# Patient Record
Sex: FEMALE | Race: ASIAN | Employment: FULL TIME | URBAN - METROPOLITAN AREA
[De-identification: names, ages, dates, MRNs, and addresses within clinical notes are randomized per-mention and may not be internally consistent; named-entity substitution may affect disease eponyms.]

---

## 2017-02-15 ENCOUNTER — TELEPHONE (OUTPATIENT)
Dept: INTERNAL MEDICINE CLINIC | Facility: CLINIC | Age: 42
End: 2017-02-15

## 2017-02-15 NOTE — TELEPHONE ENCOUNTER
Received manilla envelope with paperwork from a work-sponsored metabolic screening that pt had completed. Includes results from fasting labs drawn 2/7/17. Noted elevated Cholesterol and Triglycerides and low HDL and LDL, also elevated glucose.     Requestin

## 2017-02-15 NOTE — TELEPHONE ENCOUNTER
Patient dropped off form to be filled out by  Patient was last in office for a physical December 2016. Paperwork is in a \"minalla\" envelope. Delivered this to triage in incoming fax bin.  Please call patient when form is ready to be picked up from [facility]

## 2017-02-21 NOTE — TELEPHONE ENCOUNTER
Labs reviewed. Her triglycerides are elevated, her hdl is low, her ratio is high. Her glucose is in the prediabetic range. She needs to come in and discuss these results with me before I can sign the form. Please inform pt.

## 2017-02-27 ENCOUNTER — OFFICE VISIT (OUTPATIENT)
Dept: INTERNAL MEDICINE CLINIC | Facility: CLINIC | Age: 42
End: 2017-02-27

## 2017-02-27 VITALS
RESPIRATION RATE: 12 BRPM | BODY MASS INDEX: 29.33 KG/M2 | HEART RATE: 80 BPM | DIASTOLIC BLOOD PRESSURE: 74 MMHG | WEIGHT: 149.38 LBS | SYSTOLIC BLOOD PRESSURE: 110 MMHG | TEMPERATURE: 98 F | HEIGHT: 60 IN

## 2017-02-27 DIAGNOSIS — E78.5 HYPERLIPIDEMIA, UNSPECIFIED HYPERLIPIDEMIA TYPE: Primary | ICD-10-CM

## 2017-02-27 DIAGNOSIS — R51.9 HEADACHE, UNSPECIFIED HEADACHE TYPE: ICD-10-CM

## 2017-02-27 DIAGNOSIS — R73.01 IMPAIRED FASTING GLUCOSE: ICD-10-CM

## 2017-02-27 PROCEDURE — 99214 OFFICE O/P EST MOD 30 MIN: CPT | Performed by: INTERNAL MEDICINE

## 2017-02-27 RX ORDER — MEDROXYPROGESTERONE ACETATE 10 MG/1
TABLET ORAL
Refills: 3 | COMMUNITY
Start: 2017-02-16 | End: 2017-06-05

## 2017-02-27 RX ORDER — FENOFIBRATE 54 MG/1
54 TABLET ORAL DAILY
Qty: 90 TABLET | Refills: 0 | Status: SHIPPED | OUTPATIENT
Start: 2017-02-27 | End: 2017-06-12

## 2017-02-27 NOTE — PROGRESS NOTES
[facility]    CHIEF COMPLAINT:  Patient presents with:  Test Results: discuss recent labs        HISTORY OF PRESENT ILLNESS:  Here for follow up lab results. Pt had labs done at work which showed elevated triglycerides.  She had elevated TG last PLAN:  1. Hyperlipidemia, unspecified hyperlipidemia type  Start fenofibrate 54mg daily  Low carb low fat diet. Continue exercise. cmp in a month after starting medication.   - Fenofibrate 54 MG Oral Tab; Take 1 tablet (54 mg total) by mouth daily.   Jocelyn Simpson

## 2017-06-05 ENCOUNTER — OFFICE VISIT (OUTPATIENT)
Dept: INTERNAL MEDICINE CLINIC | Facility: CLINIC | Age: 42
End: 2017-06-05

## 2017-06-05 VITALS
RESPIRATION RATE: 12 BRPM | TEMPERATURE: 98 F | HEIGHT: 60 IN | SYSTOLIC BLOOD PRESSURE: 110 MMHG | DIASTOLIC BLOOD PRESSURE: 76 MMHG | WEIGHT: 150.38 LBS | HEART RATE: 72 BPM | BODY MASS INDEX: 29.52 KG/M2

## 2017-06-05 DIAGNOSIS — J30.2 SEASONAL ALLERGIC RHINITIS, UNSPECIFIED ALLERGIC RHINITIS TRIGGER: ICD-10-CM

## 2017-06-05 DIAGNOSIS — E78.5 HYPERLIPIDEMIA, UNSPECIFIED HYPERLIPIDEMIA TYPE: Primary | ICD-10-CM

## 2017-06-05 DIAGNOSIS — J01.00 ACUTE NON-RECURRENT MAXILLARY SINUSITIS: ICD-10-CM

## 2017-06-05 DIAGNOSIS — R73.01 IMPAIRED FASTING GLUCOSE: ICD-10-CM

## 2017-06-05 PROCEDURE — 99214 OFFICE O/P EST MOD 30 MIN: CPT | Performed by: INTERNAL MEDICINE

## 2017-06-05 RX ORDER — AMOXICILLIN 875 MG/1
875 TABLET, COATED ORAL 2 TIMES DAILY
Qty: 14 TABLET | Refills: 0 | Status: SHIPPED | OUTPATIENT
Start: 2017-06-05 | End: 2017-06-12

## 2017-06-05 NOTE — PROGRESS NOTES
[facility]    CHIEF COMPLAINT:  Patient presents with:  Medication Follow-Up: did not have labs drawn        HISTORY OF PRESENT ILLNESS:  Here for follow up. Hyperlipidemia: tolerating fenofibrate. Has not done blood work yet. No muscle aches. glucose  Urged to do a1c.     4. Acute non-recurrent maxillary sinusitis  - amoxicillin 875 MG Oral Tab; Take 1 tablet (875 mg total) by mouth 2 (two) times daily. Dispense: 14 tablet;  Refill: 0          Return to clinic in 6 months for physical unless we

## 2017-06-12 DIAGNOSIS — E78.5 HYPERLIPIDEMIA, UNSPECIFIED HYPERLIPIDEMIA TYPE: Primary | ICD-10-CM

## 2017-06-12 RX ORDER — FENOFIBRATE 145 MG/1
145 TABLET, COATED ORAL DAILY
Qty: 90 TABLET | Refills: 0 | Status: SHIPPED | OUTPATIENT
Start: 2017-06-12 | End: 2018-01-04

## 2017-06-26 NOTE — ADDENDUM NOTE
Encounter addended by: Cameron Urabno LPN on: 5/66/2117  0:24 AM<BR>    Actions taken: Letter status changed

## 2018-01-04 ENCOUNTER — PATIENT MESSAGE (OUTPATIENT)
Dept: INTERNAL MEDICINE CLINIC | Facility: CLINIC | Age: 43
End: 2018-01-04

## 2018-01-04 DIAGNOSIS — E78.5 HYPERLIPIDEMIA, UNSPECIFIED HYPERLIPIDEMIA TYPE: ICD-10-CM

## 2018-01-04 RX ORDER — FENOFIBRATE 145 MG/1
145 TABLET, COATED ORAL DAILY
Qty: 90 TABLET | Refills: 0 | Status: SHIPPED | OUTPATIENT
Start: 2018-01-04 | End: 2018-01-13

## 2018-01-04 RX ORDER — FENOFIBRATE 145 MG/1
145 TABLET, COATED ORAL DAILY
Qty: 90 TABLET | Refills: 0 | Status: CANCELLED
Start: 2018-01-04

## 2018-01-04 NOTE — TELEPHONE ENCOUNTER
From: Radha Norris  Sent: 1/4/2018 9:18 AM CST  Subject: Medication Renewal Request    Jonathan Enrique would like a refill of the following medications:     Fenofibrate 145 MG Oral Tab Daniel Moon MD]    Preferred pharm

## 2018-01-04 NOTE — TELEPHONE ENCOUNTER
From: [address]  To: Lauro Bernstein MD  Sent: 1/4/2018 9:21 AM CST  Subject: Prescription Question    Hi Dr Lilia Tam, I am out of Fenofibrate. May I ask for a 30-days refill. Thank you.

## 2018-01-04 NOTE — TELEPHONE ENCOUNTER
Last OV relevant to medication: 6/5/17  Last refill date: 6/12/17     #/refills: 90/0  When pt was asked to return for OV: 6 months  Upcoming appt/reason: 1/13/18 cpx    Lab Results  Component Value Date   CHOLEST 186 06/10/2017   TRIG 217 (H) 06/10/2017

## 2018-01-13 ENCOUNTER — OFFICE VISIT (OUTPATIENT)
Dept: INTERNAL MEDICINE CLINIC | Facility: CLINIC | Age: 43
End: 2018-01-13

## 2018-01-13 VITALS
SYSTOLIC BLOOD PRESSURE: 108 MMHG | WEIGHT: 151 LBS | DIASTOLIC BLOOD PRESSURE: 62 MMHG | TEMPERATURE: 98 F | RESPIRATION RATE: 20 BRPM | HEART RATE: 68 BPM | BODY MASS INDEX: 29.64 KG/M2 | HEIGHT: 59.65 IN

## 2018-01-13 DIAGNOSIS — Z00.00 PHYSICAL EXAM, ANNUAL: ICD-10-CM

## 2018-01-13 DIAGNOSIS — E78.5 HYPERLIPIDEMIA, UNSPECIFIED HYPERLIPIDEMIA TYPE: ICD-10-CM

## 2018-01-13 DIAGNOSIS — Z12.39 SCREENING FOR MALIGNANT NEOPLASM OF BREAST: ICD-10-CM

## 2018-01-13 DIAGNOSIS — L30.9 ECZEMA, UNSPECIFIED TYPE: ICD-10-CM

## 2018-01-13 DIAGNOSIS — R21 RASH: ICD-10-CM

## 2018-01-13 DIAGNOSIS — Z23 NEED FOR VACCINATION: ICD-10-CM

## 2018-01-13 PROCEDURE — 99396 PREV VISIT EST AGE 40-64: CPT | Performed by: INTERNAL MEDICINE

## 2018-01-13 PROCEDURE — 90715 TDAP VACCINE 7 YRS/> IM: CPT | Performed by: INTERNAL MEDICINE

## 2018-01-13 PROCEDURE — 90471 IMMUNIZATION ADMIN: CPT | Performed by: INTERNAL MEDICINE

## 2018-01-13 RX ORDER — FENOFIBRATE 145 MG/1
72.5 TABLET, COATED ORAL DAILY
Qty: 90 TABLET | Refills: 0 | COMMUNITY
Start: 2018-01-13 | End: 2018-11-05

## 2018-01-13 RX ORDER — MOMETASONE FUROATE 1 MG/G
1 CREAM TOPICAL 2 TIMES DAILY PRN
Qty: 60 G | Refills: 0 | Status: SHIPPED | OUTPATIENT
Start: 2018-01-13 | End: 2018-10-29 | Stop reason: ALTCHOICE

## 2018-01-13 NOTE — PROGRESS NOTES
[facility]    CHIEF COMPLAINT: Patient presents with:  Physical: Needs recommendation for new gyne. No pelvic today. Flu shot current. Mamm due, last ordered by Dr Sherron Hook, pended.  tdap is due        HPI:   Sabino Padilla is a 36 Ascorbic Acid (VITAMIN C GUMMIE OR) Take by mouth daily. Disp:  Rfl:       Past Medical History:   Diagnosis Date   • Myoma     uterine   • Scoliosis       History reviewed. No pertinent surgical history.    Family History   Problem Relation Age of Onse MUSCULOSKELETAL: back is not tender,FROM of the back  EXTREMITIES: no cyanosis, clubbing or edema  NEURO: Oriented times three,cranial nerves are intact,motor and sensory are grossly intact    Labs:   No results found for: WBC, HGB, PLT   No results foun

## 2018-04-13 ENCOUNTER — TELEPHONE (OUTPATIENT)
Dept: INTERNAL MEDICINE CLINIC | Facility: CLINIC | Age: 43
End: 2018-04-13

## 2018-04-13 NOTE — TELEPHONE ENCOUNTER
Incoming (mail or fax): Fax  Received from:  [facility]  Documentation given to:  Netta Solis test results bin.

## 2018-04-14 NOTE — TELEPHONE ENCOUNTER
Reviewed wellness screening labs. Elevated triglycerides. There are still labs that we had ordered during her cpx that she has not done. Please remind her to do these. Wellness labs sent for scanning.

## 2018-04-16 NOTE — TELEPHONE ENCOUNTER
Reviewed labs, glucose and of lipid panel only. Will cancel outstanding lipid panel. Labs added to external results console. Labs to scan. Reviewed labs received from [employer], advised on triglycerides. Reminded about addl labs outstanding, fast 6-8hr.  Pt

## 2018-04-16 NOTE — TELEPHONE ENCOUNTER
Noted below. To avoid duplicates, can I cancel any of the previously ordered labs? Lipid was completed already, were any others like A1c?

## 2018-04-16 NOTE — TELEPHONE ENCOUNTER
As far as I know it was just the lipid panel. I sent the labs for scanning already, will need to obtain and check if anything else was done. Okay to cancel what was already done.

## 2018-10-29 ENCOUNTER — OFFICE VISIT (OUTPATIENT)
Dept: OBGYN CLINIC | Facility: CLINIC | Age: 43
End: 2018-10-29
Payer: COMMERCIAL

## 2018-10-29 VITALS
HEART RATE: 76 BPM | SYSTOLIC BLOOD PRESSURE: 118 MMHG | BODY MASS INDEX: 27.92 KG/M2 | WEIGHT: 146 LBS | DIASTOLIC BLOOD PRESSURE: 70 MMHG | HEIGHT: 60.75 IN

## 2018-10-29 DIAGNOSIS — Z12.4 CERVICAL CANCER SCREENING: ICD-10-CM

## 2018-10-29 DIAGNOSIS — R10.2 PELVIC PAIN: ICD-10-CM

## 2018-10-29 DIAGNOSIS — Z86.018 HISTORY OF UTERINE FIBROID: ICD-10-CM

## 2018-10-29 DIAGNOSIS — Z12.39 BREAST CANCER SCREENING: ICD-10-CM

## 2018-10-29 DIAGNOSIS — Z01.419 WELL WOMAN EXAM WITH ROUTINE GYNECOLOGICAL EXAM: Primary | ICD-10-CM

## 2018-10-29 PROCEDURE — 99386 PREV VISIT NEW AGE 40-64: CPT | Performed by: NURSE PRACTITIONER

## 2018-10-29 PROCEDURE — 87624 HPV HI-RISK TYP POOLED RSLT: CPT | Performed by: NURSE PRACTITIONER

## 2018-10-29 PROCEDURE — 88175 CYTOPATH C/V AUTO FLUID REDO: CPT | Performed by: NURSE PRACTITIONER

## 2018-10-29 NOTE — PATIENT INSTRUCTIONS
[facility]  [address], [place], [address] [facility]  Phone: [phone]

## 2018-11-05 DIAGNOSIS — E78.5 HYPERLIPIDEMIA, UNSPECIFIED HYPERLIPIDEMIA TYPE: ICD-10-CM

## 2018-11-07 RX ORDER — FENOFIBRATE 145 MG/1
72.5 TABLET, COATED ORAL DAILY
Qty: 15 TABLET | Refills: 0 | Status: SHIPPED | OUTPATIENT
Start: 2018-11-07 | End: 2021-07-13

## 2018-11-07 NOTE — TELEPHONE ENCOUNTER
Last OV relevant to medication: 1/13/18  Last refill date: 1/13/18    #90/refills: 0  When pt was asked to return for OV:1 month  Upcoming appt/reason: No appt scheduled. See 5/14/18 scan  Labs drawn: 1/25/18. Tri-163 Tot Chol-176 LDL-104 HDL-43  LMTCB.  P

## 2018-11-09 ENCOUNTER — HOSPITAL ENCOUNTER (OUTPATIENT)
Dept: MAMMOGRAPHY | Age: 43
Discharge: HOME | End: 2018-11-09
Attending: NURSE PRACTITIONER
Payer: COMMERCIAL

## 2018-11-09 DIAGNOSIS — Z12.39 BREAST CANCER SCREENING: ICD-10-CM

## 2018-11-09 PROCEDURE — 77063 BREAST TOMOSYNTHESIS BI: CPT | Performed by: NURSE PRACTITIONER

## 2018-11-09 PROCEDURE — 77067 SCR MAMMO BI INCL CAD: CPT | Performed by: NURSE PRACTITIONER

## 2018-11-20 ENCOUNTER — HOSPITAL ENCOUNTER (OUTPATIENT)
Dept: ULTRASOUND IMAGING | Age: 43
Discharge: HOME | End: 2018-11-20
Attending: NURSE PRACTITIONER
Payer: COMMERCIAL

## 2018-11-20 DIAGNOSIS — R10.2 PELVIC PAIN: ICD-10-CM

## 2018-11-20 DIAGNOSIS — Z86.018 HISTORY OF UTERINE FIBROID: ICD-10-CM

## 2018-11-20 PROCEDURE — 76856 US EXAM PELVIC COMPLETE: CPT | Performed by: NURSE PRACTITIONER

## 2018-11-20 PROCEDURE — 76830 TRANSVAGINAL US NON-OB: CPT | Performed by: NURSE PRACTITIONER

## 2021-03-27 ENCOUNTER — PATIENT MESSAGE (OUTPATIENT)
Dept: INTERNAL MEDICINE CLINIC | Facility: CLINIC | Age: 46
End: 2021-03-27

## 2021-03-29 NOTE — TELEPHONE ENCOUNTER
From: Rodriguez Penaloza  To: Scott Alfaro MD  Sent: 3/27/2021 1:02 PM CDT  Subject: Other    Hello, I'd like to get a schedule for Covid vaccine at [facility] in [facility] please.  I'm planning to visit my family overseas in [place]

## 2021-07-02 ENCOUNTER — TELEPHONE (OUTPATIENT)
Dept: INTERNAL MEDICINE CLINIC | Facility: CLINIC | Age: 46
End: 2021-07-02

## 2021-07-02 DIAGNOSIS — Z00.00 ANNUAL PHYSICAL EXAM: Primary | ICD-10-CM

## 2021-07-02 NOTE — TELEPHONE ENCOUNTER
Franco Palencia 2: Appointment Request  FW: Appointment Request   Ann Marie Gonzalez   Sent: Yuko Esqueda 2021  8:03 AM   To: Tan Cisneros 29 Clinical Staff    [address]   MRN: [id number] : 1975   Pt Work: [phone]

## 2021-07-02 NOTE — TELEPHONE ENCOUNTER
PSR tried to schedule pt, could not get pt in with  till August, pt refused Tierney and said she will call back.  Pt has not been seen since 1/2018    Thank you

## 2021-07-02 NOTE — TELEPHONE ENCOUNTER
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

## 2021-07-02 NOTE — TELEPHONE ENCOUNTER
She needs to be seen before labs are ordered since it has been so long since she was seen by us. We can also hold this for Dr. Queta Hook in case she would like to go forward with thumb.   Also Dr. Queta Hook may want the back pain in the physical to be separate appoi

## 2021-07-06 NOTE — TELEPHONE ENCOUNTER
Hasn't been seen since 2018. Will need appt to assess her back pain, also needs cpx. Okay to do in one visit as long as pt is aware there will be a separate charge. Please order cbc, cmp, lipid, tsh, a1c.  Does not need to be extended visit, this way she wi

## 2021-07-06 NOTE — TELEPHONE ENCOUNTER
Mychart sent to pt. PSR-Please see note below from Dr. Palomino Branch  Please call pt to reschedule her appt to be sooner in July, within a week.  Thanks

## 2021-07-07 NOTE — TELEPHONE ENCOUNTER
Spoke with pt advised to complete fasting labs prior  Notified she will be seen sooner for PE and back pain both but will be charged separately  Pt v/u  Call transfer to front to make appt

## 2021-07-10 ENCOUNTER — LAB ENCOUNTER (OUTPATIENT)
Dept: LAB | Age: 46
End: 2021-07-10
Attending: INTERNAL MEDICINE
Payer: COMMERCIAL

## 2021-07-10 LAB
ALBUMIN SERPL-MCNC: 3.7 G/DL (ref 3.4–5)
ALBUMIN/GLOB SERPL: 0.9 {RATIO} (ref 1–2)
ALP LIVER SERPL-CCNC: 65 U/L
ALT SERPL-CCNC: 27 U/L
ANION GAP SERPL CALC-SCNC: 5 MMOL/L (ref 0–18)
AST SERPL-CCNC: 19 U/L (ref 15–37)
BASOPHILS # BLD AUTO: 0.06 X10(3) UL (ref 0–0.2)
BASOPHILS NFR BLD AUTO: 0.7 %
BILIRUB SERPL-MCNC: 0.5 MG/DL (ref 0.1–2)
BUN BLD-MCNC: 12 MG/DL (ref 7–18)
BUN/CREAT SERPL: 23.1 (ref 10–20)
CALCIUM BLD-MCNC: 8.8 MG/DL (ref 8.5–10.1)
CHLORIDE SERPL-SCNC: 107 MMOL/L (ref 98–112)
CHOLEST SMN-MCNC: 241 MG/DL (ref ?–200)
CO2 SERPL-SCNC: 25 MMOL/L (ref 21–32)
CREAT BLD-MCNC: 0.52 MG/DL
DEPRECATED RDW RBC AUTO: 44.6 FL (ref 35.1–46.3)
EOSINOPHIL # BLD AUTO: 0.22 X10(3) UL (ref 0–0.7)
EOSINOPHIL NFR BLD AUTO: 2.7 %
ERYTHROCYTE [DISTWIDTH] IN BLOOD BY AUTOMATED COUNT: 13.2 % (ref 11–15)
EST. AVERAGE GLUCOSE BLD GHB EST-MCNC: 120 MG/DL (ref 68–126)
GLOBULIN PLAS-MCNC: 4.3 G/DL (ref 2.8–4.4)
GLUCOSE BLD-MCNC: 90 MG/DL (ref 70–99)
HBA1C MFR BLD HPLC: 5.8 % (ref ?–5.7)
HCT VFR BLD AUTO: 42.2 %
HDLC SERPL-MCNC: 41 MG/DL (ref 40–59)
HGB BLD-MCNC: 13.3 G/DL
IMM GRANULOCYTES # BLD AUTO: 0.03 X10(3) UL (ref 0–1)
IMM GRANULOCYTES NFR BLD: 0.4 %
LDLC SERPL CALC-MCNC: 136 MG/DL (ref ?–100)
LYMPHOCYTES # BLD AUTO: 3.74 X10(3) UL (ref 1–4)
LYMPHOCYTES NFR BLD AUTO: 45.2 %
M PROTEIN MFR SERPL ELPH: 8 G/DL (ref 6.4–8.2)
MCH RBC QN AUTO: 28.9 PG (ref 26–34)
MCHC RBC AUTO-ENTMCNC: 31.5 G/DL (ref 31–37)
MCV RBC AUTO: 91.5 FL
MONOCYTES # BLD AUTO: 0.45 X10(3) UL (ref 0.1–1)
MONOCYTES NFR BLD AUTO: 5.4 %
NEUTROPHILS # BLD AUTO: 3.77 X10 (3) UL (ref 1.5–7.7)
NEUTROPHILS # BLD AUTO: 3.77 X10(3) UL (ref 1.5–7.7)
NEUTROPHILS NFR BLD AUTO: 45.6 %
NONHDLC SERPL-MCNC: 200 MG/DL (ref ?–130)
OSMOLALITY SERPL CALC.SUM OF ELEC: 283 MOSM/KG (ref 275–295)
PATIENT FASTING Y/N/NP: YES
PATIENT FASTING Y/N/NP: YES
PLATELET # BLD AUTO: 277 10(3)UL (ref 150–450)
POTASSIUM SERPL-SCNC: 4.5 MMOL/L (ref 3.5–5.1)
RBC # BLD AUTO: 4.61 X10(6)UL
SODIUM SERPL-SCNC: 137 MMOL/L (ref 136–145)
TRIGL SERPL-MCNC: 352 MG/DL (ref 30–149)
TSI SER-ACNC: 2.39 MIU/ML (ref 0.36–3.74)
VLDLC SERPL CALC-MCNC: 66 MG/DL (ref 0–30)
WBC # BLD AUTO: 8.3 X10(3) UL (ref 4–11)

## 2021-07-10 PROCEDURE — 80061 LIPID PANEL: CPT | Performed by: INTERNAL MEDICINE

## 2021-07-10 PROCEDURE — 80050 GENERAL HEALTH PANEL: CPT | Performed by: INTERNAL MEDICINE

## 2021-07-10 PROCEDURE — 83036 HEMOGLOBIN GLYCOSYLATED A1C: CPT | Performed by: INTERNAL MEDICINE

## 2021-07-13 ENCOUNTER — OFFICE VISIT (OUTPATIENT)
Dept: INTERNAL MEDICINE CLINIC | Facility: CLINIC | Age: 46
End: 2021-07-13
Payer: COMMERCIAL

## 2021-07-13 ENCOUNTER — HOSPITAL ENCOUNTER (OUTPATIENT)
Dept: GENERAL RADIOLOGY | Age: 46
Discharge: HOME | End: 2021-07-13
Attending: INTERNAL MEDICINE
Payer: COMMERCIAL

## 2021-07-13 VITALS
SYSTOLIC BLOOD PRESSURE: 126 MMHG | BODY MASS INDEX: 29.62 KG/M2 | DIASTOLIC BLOOD PRESSURE: 78 MMHG | TEMPERATURE: 98 F | WEIGHT: 150.88 LBS | RESPIRATION RATE: 12 BRPM | HEART RATE: 74 BPM | HEIGHT: 59.75 IN

## 2021-07-13 DIAGNOSIS — M54.9 CHRONIC BILATERAL BACK PAIN, UNSPECIFIED BACK LOCATION: ICD-10-CM

## 2021-07-13 DIAGNOSIS — R20.2 PARESTHESIAS IN LEFT HAND: ICD-10-CM

## 2021-07-13 DIAGNOSIS — R10.11 ABDOMINAL PAIN, RUQ: ICD-10-CM

## 2021-07-13 DIAGNOSIS — M41.9 SCOLIOSIS OF THORACOLUMBAR SPINE, UNSPECIFIED SCOLIOSIS TYPE: ICD-10-CM

## 2021-07-13 DIAGNOSIS — E78.5 HYPERLIPIDEMIA, UNSPECIFIED HYPERLIPIDEMIA TYPE: ICD-10-CM

## 2021-07-13 DIAGNOSIS — Z12.31 ENCOUNTER FOR SCREENING MAMMOGRAM FOR BREAST CANCER: ICD-10-CM

## 2021-07-13 DIAGNOSIS — Z00.00 PHYSICAL EXAM, ANNUAL: ICD-10-CM

## 2021-07-13 DIAGNOSIS — G89.29 CHRONIC BILATERAL BACK PAIN, UNSPECIFIED BACK LOCATION: ICD-10-CM

## 2021-07-13 DIAGNOSIS — R10.9 BILATERAL FLANK PAIN: ICD-10-CM

## 2021-07-13 PROCEDURE — 72082 X-RAY EXAM ENTIRE SPI 2/3 VW: CPT | Performed by: INTERNAL MEDICINE

## 2021-07-13 PROCEDURE — 99204 OFFICE O/P NEW MOD 45 MIN: CPT | Performed by: INTERNAL MEDICINE

## 2021-07-13 PROCEDURE — 3074F SYST BP LT 130 MM HG: CPT | Performed by: INTERNAL MEDICINE

## 2021-07-13 PROCEDURE — 3078F DIAST BP <80 MM HG: CPT | Performed by: INTERNAL MEDICINE

## 2021-07-13 PROCEDURE — 99386 PREV VISIT NEW AGE 40-64: CPT | Performed by: INTERNAL MEDICINE

## 2021-07-13 PROCEDURE — 3008F BODY MASS INDEX DOCD: CPT | Performed by: INTERNAL MEDICINE

## 2021-07-13 RX ORDER — FENOFIBRATE 145 MG/1
72.5 TABLET, COATED ORAL DAILY
Qty: 45 TABLET | Refills: 0 | Status: SHIPPED | OUTPATIENT
Start: 2021-07-13 | End: 2021-07-16

## 2021-07-13 NOTE — PROGRESS NOTES
[facility]    CHIEF COMPLAINT: Patient presents with:  Routine Physical: Sees gyne. 10/29/18-pap. 11/9/18-mammo.    Medication Follow-Up: stopped Fenofibrate x 2 years ago        HPI:   Olinda Rodriguez is a 54year old female who (CALCIUM + D OR) Take by mouth daily. • Ascorbic Acid (VITAMIN C GUMMIE OR) Take by mouth daily.           Past Medical History:   Diagnosis Date   • Abnormal uterine bleeding    • Amenorrhea    • Dysmenorrhea    • Fibroids    • Hyperlipidemia    • Myom s1 and s2, RRR without murmur  GI: good BS's,no masses, HSM or tenderness  BREAST: Deferred. To be done at gyne. GENITAL/URINARY:  Deferred. To be done at gyne.     MUSCULOSKELETAL: tenderness to palpation of paraspinal back muscles, thoracolumbar scolio - XR SCOLIOSIS SPINE 2-3 VIEWS (CPT=72082); Future  - OP REFERRAL TO [facility]    4. Scoliosis of thoracolumbar spine, unspecified scoliosis type  - XR SCOLIOSIS SPINE 2-3 VIEWS (CPT=72082); Future    5.  Paresthesias in left alvarado

## 2021-07-14 ENCOUNTER — TELEPHONE (OUTPATIENT)
Dept: INTERNAL MEDICINE CLINIC | Facility: CLINIC | Age: 46
End: 2021-07-14

## 2021-07-14 DIAGNOSIS — E78.5 HYPERLIPIDEMIA, UNSPECIFIED HYPERLIPIDEMIA TYPE: Primary | ICD-10-CM

## 2021-07-14 NOTE — TELEPHONE ENCOUNTER
Incoming (mail or fax):  fax  Received from:  [employer]  Documentation given to:  Triage in basket     Needs review and signature

## 2021-07-15 NOTE — TELEPHONE ENCOUNTER
Fax received from [facility] regarding fenofibrate ordered. Not recommended to cut these tablets in half. Asking for alternative strength or Dr to sign form confirming you still want pt to cut in half. Form routed to Dr Melanie Sexton.

## 2021-07-16 ENCOUNTER — PATIENT MESSAGE (OUTPATIENT)
Dept: INTERNAL MEDICINE CLINIC | Facility: CLINIC | Age: 46
End: 2021-07-16

## 2021-07-16 RX ORDER — FENOFIBRATE 67 MG/1
1 CAPSULE ORAL NIGHTLY
Qty: 90 CAPSULE | Refills: 0 | Status: SHIPPED | OUTPATIENT
Start: 2021-07-16 | End: 2021-10-25

## 2021-07-16 NOTE — TELEPHONE ENCOUNTER
Pt is calling back in regards to an update on their medication and the pharamacy. [facility] to hear word from the office to clear up some questions. Pt would like for [facility]  Please advise, thank y

## 2021-07-16 NOTE — TELEPHONE ENCOUNTER
Noted. Will do rx for a lower dose. Pended 67mg capsules. No pharmacy selected. Please update and send medication. Please inform pt about the change.

## 2021-09-12 ENCOUNTER — HOSPITAL ENCOUNTER (OUTPATIENT)
Dept: ULTRASOUND IMAGING | Age: 46
Discharge: HOME | End: 2021-09-12
Attending: INTERNAL MEDICINE
Payer: COMMERCIAL

## 2021-09-12 DIAGNOSIS — R10.11 ABDOMINAL PAIN, RUQ: ICD-10-CM

## 2021-09-12 DIAGNOSIS — R10.9 BILATERAL FLANK PAIN: ICD-10-CM

## 2021-09-12 PROCEDURE — 76700 US EXAM ABDOM COMPLETE: CPT | Performed by: INTERNAL MEDICINE

## 2021-09-14 NOTE — PROGRESS NOTES
Spoke to patient, aware of results and recommendations. Patient voice understandings. Sent referral information to pt via [employer] as well   Referral for General surgery and Urology placed.    Urology  Roderick Chand MD   [address] [facility]

## 2021-09-20 ENCOUNTER — HOSPITAL ENCOUNTER (EMERGENCY)
Facility: HOSPITAL | Age: 46
Discharge: HOME | End: 2021-09-20
Attending: EMERGENCY MEDICINE
Payer: COMMERCIAL

## 2021-09-20 ENCOUNTER — APPOINTMENT (OUTPATIENT)
Dept: CT IMAGING | Facility: HOSPITAL | Age: 46
End: 2021-09-20
Attending: EMERGENCY MEDICINE
Payer: COMMERCIAL

## 2021-09-20 ENCOUNTER — APPOINTMENT (OUTPATIENT)
Dept: ULTRASOUND IMAGING | Facility: HOSPITAL | Age: 46
End: 2021-09-20
Attending: EMERGENCY MEDICINE
Payer: COMMERCIAL

## 2021-09-20 VITALS
WEIGHT: 150 LBS | OXYGEN SATURATION: 97 % | RESPIRATION RATE: 20 BRPM | TEMPERATURE: 98 F | SYSTOLIC BLOOD PRESSURE: 136 MMHG | DIASTOLIC BLOOD PRESSURE: 80 MMHG | HEIGHT: 60 IN | BODY MASS INDEX: 29.45 KG/M2 | HEART RATE: 87 BPM

## 2021-09-20 DIAGNOSIS — R10.9 ACUTE RIGHT FLANK PAIN: Primary | ICD-10-CM

## 2021-09-20 LAB
ALBUMIN SERPL-MCNC: 4.3 G/DL (ref 3.4–5)
ALBUMIN/GLOB SERPL: 0.9 {RATIO} (ref 1–2)
ALP LIVER SERPL-CCNC: 70 U/L
ALT SERPL-CCNC: 39 U/L
ANION GAP SERPL CALC-SCNC: 5 MMOL/L (ref 0–18)
AST SERPL-CCNC: 22 U/L (ref 15–37)
BASOPHILS # BLD AUTO: 0.06 X10(3) UL (ref 0–0.2)
BASOPHILS NFR BLD AUTO: 0.6 %
BILIRUB SERPL-MCNC: 0.3 MG/DL (ref 0.1–2)
BILIRUB UR QL STRIP.AUTO: NEGATIVE
BUN BLD-MCNC: 15 MG/DL (ref 7–18)
CALCIUM BLD-MCNC: 9.1 MG/DL (ref 8.5–10.1)
CHLORIDE SERPL-SCNC: 105 MMOL/L (ref 98–112)
CLARITY UR REFRACT.AUTO: CLEAR
CO2 SERPL-SCNC: 27 MMOL/L (ref 21–32)
COLOR UR AUTO: YELLOW
CREAT BLD-MCNC: 0.68 MG/DL
EOSINOPHIL # BLD AUTO: 0.24 X10(3) UL (ref 0–0.7)
EOSINOPHIL NFR BLD AUTO: 2.4 %
ERYTHROCYTE [DISTWIDTH] IN BLOOD BY AUTOMATED COUNT: 13.2 %
GLOBULIN PLAS-MCNC: 4.8 G/DL (ref 2.8–4.4)
GLUCOSE BLD-MCNC: 91 MG/DL (ref 70–99)
GLUCOSE UR STRIP.AUTO-MCNC: NEGATIVE MG/DL
HCT VFR BLD AUTO: 39.7 %
HGB BLD-MCNC: 13.4 G/DL
IMM GRANULOCYTES # BLD AUTO: 0.01 X10(3) UL (ref 0–1)
IMM GRANULOCYTES NFR BLD: 0.1 %
KETONES UR STRIP.AUTO-MCNC: NEGATIVE MG/DL
LIPASE SERPL-CCNC: 138 U/L (ref 73–393)
LYMPHOCYTES # BLD AUTO: 4.78 X10(3) UL (ref 1–4)
LYMPHOCYTES NFR BLD AUTO: 47 %
MCH RBC QN AUTO: 29.6 PG (ref 26–34)
MCHC RBC AUTO-ENTMCNC: 33.8 G/DL (ref 31–37)
MCV RBC AUTO: 87.6 FL
MONOCYTES # BLD AUTO: 0.6 X10(3) UL (ref 0.1–1)
MONOCYTES NFR BLD AUTO: 5.9 %
NEUTROPHILS # BLD AUTO: 4.48 X10 (3) UL (ref 1.5–7.7)
NEUTROPHILS # BLD AUTO: 4.48 X10(3) UL (ref 1.5–7.7)
NEUTROPHILS NFR BLD AUTO: 44 %
NITRITE UR QL STRIP.AUTO: NEGATIVE
OSMOLALITY SERPL CALC.SUM OF ELEC: 284 MOSM/KG (ref 275–295)
PH UR STRIP.AUTO: 6 [PH] (ref 5–8)
PLATELET # BLD AUTO: 282 10(3)UL (ref 150–450)
POTASSIUM SERPL-SCNC: 3.3 MMOL/L (ref 3.5–5.1)
PROT SERPL-MCNC: 9.1 G/DL (ref 6.4–8.2)
PROT UR STRIP.AUTO-MCNC: NEGATIVE MG/DL
RBC # BLD AUTO: 4.53 X10(6)UL
SODIUM SERPL-SCNC: 137 MMOL/L (ref 136–145)
SP GR UR STRIP.AUTO: 1.02 (ref 1–1.03)
UROBILINOGEN UR STRIP.AUTO-MCNC: <2 MG/DL
WBC # BLD AUTO: 10.2 X10(3) UL (ref 4–11)

## 2021-09-20 PROCEDURE — 96361 HYDRATE IV INFUSION ADD-ON: CPT

## 2021-09-20 PROCEDURE — 81001 URINALYSIS AUTO W/SCOPE: CPT | Performed by: EMERGENCY MEDICINE

## 2021-09-20 PROCEDURE — 99284 EMERGENCY DEPT VISIT MOD MDM: CPT

## 2021-09-20 PROCEDURE — 87086 URINE CULTURE/COLONY COUNT: CPT | Performed by: EMERGENCY MEDICINE

## 2021-09-20 PROCEDURE — 76700 US EXAM ABDOM COMPLETE: CPT | Performed by: EMERGENCY MEDICINE

## 2021-09-20 PROCEDURE — 83690 ASSAY OF LIPASE: CPT | Performed by: EMERGENCY MEDICINE

## 2021-09-20 PROCEDURE — 96374 THER/PROPH/DIAG INJ IV PUSH: CPT

## 2021-09-20 PROCEDURE — 80053 COMPREHEN METABOLIC PANEL: CPT | Performed by: EMERGENCY MEDICINE

## 2021-09-20 PROCEDURE — 74176 CT ABD & PELVIS W/O CONTRAST: CPT | Performed by: EMERGENCY MEDICINE

## 2021-09-20 PROCEDURE — 85025 COMPLETE CBC W/AUTO DIFF WBC: CPT | Performed by: EMERGENCY MEDICINE

## 2021-09-20 RX ORDER — KETOROLAC TROMETHAMINE 30 MG/ML
15 INJECTION, SOLUTION INTRAMUSCULAR; INTRAVENOUS ONCE
Status: COMPLETED | OUTPATIENT
Start: 2021-09-20 | End: 2021-09-20

## 2021-09-20 RX ORDER — POTASSIUM CHLORIDE 20 MEQ/1
40 TABLET, EXTENDED RELEASE ORAL ONCE
Status: COMPLETED | OUTPATIENT
Start: 2021-09-20 | End: 2021-09-20

## 2021-09-20 NOTE — ED INITIAL ASSESSMENT (HPI)
Patient to ED c/o intermittent right flank pain, yesterday was worse. Ultrasound was done 1 week ago, dx with gallstones and mild hydronephrosis.   Unable to see a urologist until November

## 2021-09-20 NOTE — ED PROVIDER NOTES
Patient Seen in: [facility] Emergency Department      History   Patient presents with:  Abdomen/Flank Pain    Stated Complaint: R FLANK PAIN    Subjective:   HPI    14-year-old female presents for evaluation of right flank pain.   Patient has had int reactive. Extraocular motions intact. Oropharynx clear. Neck: Supple  Lungs: Clear to auscultation bilaterally. Heart: Regular rate and rhythm. Abdomen: Soft, nontender. Skin: No rash. No edema. Neurologic: No focal neurologic deficits.   Normal s  patient states bilateral flank pain worse on rt side.  patient states ruq pain.            FINDINGS:     LIVER:  Increased echogenicity throughout the liver consistent with mild diffuse fatty infiltration.    BILIARY: Boris Donning is a 3 x 3 x 2 mm echogenic foc right flank pain    FINDINGS:  LIVER:  Uniform echotexture. BILIARY:  Nondistended gallbladder. No shadowing gallstones. No biliary dilatation. CBD is measured at 0.4 cm. PANCREAS:  Uniform echogenicity. SPLEEN:  Uniform echotexture.   Bipolar Size 9.0 c colonic inflammation. Moderate stool scattered throughout the colon. Normal appendix. No free air or ascites. ABDOMINAL WALL:  No hernia. BONES:  Normal vertebral body heights and disc spaces. PELVIC ORGANS:  No uterine or ovarian abnormality.   Nondiste

## 2021-09-21 ENCOUNTER — PATIENT MESSAGE (OUTPATIENT)
Dept: INTERNAL MEDICINE CLINIC | Facility: CLINIC | Age: 46
End: 2021-09-21

## 2021-09-21 NOTE — TELEPHONE ENCOUNTER
From: Norah Ashraf  To: Nathaniel Augustine MD  Sent: 9/21/2021 7:20 AM CDT  Subject: Kaylinrenay Inocencio please squeeze me in for visit today    Hi Dr Raven Patino, I had pain in my right side yesterday and went to ER.  They did blood & urine tests, CT scan and Ult

## 2021-09-21 NOTE — TELEPHONE ENCOUNTER
Patient said she would call back to schedule a follow up since both providers' schedule were full today

## 2021-09-22 ENCOUNTER — TELEMEDICINE (OUTPATIENT)
Dept: INTERNAL MEDICINE CLINIC | Facility: CLINIC | Age: 46
End: 2021-09-22

## 2021-09-22 DIAGNOSIS — R10.11 RUQ PAIN: Primary | ICD-10-CM

## 2021-09-22 DIAGNOSIS — R10.9 RIGHT FLANK PAIN: ICD-10-CM

## 2021-09-22 DIAGNOSIS — R31.21 ASYMPTOMATIC MICROSCOPIC HEMATURIA: ICD-10-CM

## 2021-09-22 PROCEDURE — 99214 OFFICE O/P EST MOD 30 MIN: CPT | Performed by: INTERNAL MEDICINE

## 2021-09-22 NOTE — PROGRESS NOTES
Virtual Telephone Check-In    [address] verbally consents to a Virtual/Telephone Check-In visit on 09/22/21. Patient has been referred to the [facility] website at [email or web address] to review the yearly Consent to Treat document. tenderness to self palpation during visit today.      DATA:  Results for orders placed or performed during the hospital encounter of 12/65/71   Comp Metabolic Panel (14)   Result Value Ref Range    Glucose 91 70 - 99 mg/dL    Sodium 137 136 - 145 mmol/L 3.80 - 5.30 x10(6)uL    HGB 13.4 12.0 - 16.0 g/dL    HCT 39.7 35.0 - 48.0 %    .0 150.0 - 450.0 10(3)uL    MCV 87.6 80.0 - 100.0 fL    MCH 29.6 26.0 - 34.0 pg    MCHC 33.8 31.0 - 37.0 g/dL    RDW 13.2 %    Neutrophil Absolute Prelim 4.48 1.50 - 7.70 visitation. There are limitations of this visit as no physical exam could be performed. Every conscious effort was taken to allow for sufficient and adequate time.   This billing was spent on reviewing labs, medications, radiology tests and decision makin

## 2021-10-16 ENCOUNTER — LAB ENCOUNTER (OUTPATIENT)
Dept: LAB | Age: 46
End: 2021-10-16
Attending: INTERNAL MEDICINE
Payer: COMMERCIAL

## 2021-10-16 DIAGNOSIS — E78.5 HYPERLIPIDEMIA, UNSPECIFIED HYPERLIPIDEMIA TYPE: ICD-10-CM

## 2021-10-16 DIAGNOSIS — R31.21 ASYMPTOMATIC MICROSCOPIC HEMATURIA: ICD-10-CM

## 2021-10-16 PROCEDURE — 81001 URINALYSIS AUTO W/SCOPE: CPT | Performed by: INTERNAL MEDICINE

## 2021-10-16 PROCEDURE — 80061 LIPID PANEL: CPT | Performed by: INTERNAL MEDICINE

## 2021-10-16 PROCEDURE — 87086 URINE CULTURE/COLONY COUNT: CPT | Performed by: INTERNAL MEDICINE

## 2021-10-16 PROCEDURE — 80053 COMPREHEN METABOLIC PANEL: CPT | Performed by: INTERNAL MEDICINE

## 2021-10-19 PROBLEM — K21.9 GASTROESOPHAGEAL REFLUX DISEASE: Status: ACTIVE | Noted: 2021-10-19

## 2021-10-25 ENCOUNTER — OFFICE VISIT (OUTPATIENT)
Dept: INTERNAL MEDICINE CLINIC | Facility: CLINIC | Age: 46
End: 2021-10-25
Payer: COMMERCIAL

## 2021-10-25 VITALS
DIASTOLIC BLOOD PRESSURE: 82 MMHG | HEART RATE: 74 BPM | TEMPERATURE: 98 F | SYSTOLIC BLOOD PRESSURE: 132 MMHG | WEIGHT: 152.5 LBS | RESPIRATION RATE: 16 BRPM | OXYGEN SATURATION: 98 % | BODY MASS INDEX: 29.94 KG/M2 | HEIGHT: 59.75 IN

## 2021-10-25 DIAGNOSIS — E78.5 HYPERLIPIDEMIA, UNSPECIFIED HYPERLIPIDEMIA TYPE: ICD-10-CM

## 2021-10-25 DIAGNOSIS — Z23 NEED FOR VACCINATION: ICD-10-CM

## 2021-10-25 DIAGNOSIS — R31.21 ASYMPTOMATIC MICROSCOPIC HEMATURIA: ICD-10-CM

## 2021-10-25 PROCEDURE — 3079F DIAST BP 80-89 MM HG: CPT | Performed by: INTERNAL MEDICINE

## 2021-10-25 PROCEDURE — 3075F SYST BP GE 130 - 139MM HG: CPT | Performed by: INTERNAL MEDICINE

## 2021-10-25 PROCEDURE — 99214 OFFICE O/P EST MOD 30 MIN: CPT | Performed by: INTERNAL MEDICINE

## 2021-10-25 PROCEDURE — 90471 IMMUNIZATION ADMIN: CPT | Performed by: INTERNAL MEDICINE

## 2021-10-25 PROCEDURE — 90686 IIV4 VACC NO PRSV 0.5 ML IM: CPT | Performed by: INTERNAL MEDICINE

## 2021-10-25 PROCEDURE — 3008F BODY MASS INDEX DOCD: CPT | Performed by: INTERNAL MEDICINE

## 2021-10-25 RX ORDER — FENOFIBRATE 67 MG/1
1 CAPSULE ORAL NIGHTLY
Qty: 90 CAPSULE | Refills: 2 | Status: SHIPPED | OUTPATIENT
Start: 2021-10-25 | End: 2022-01-28

## 2021-10-25 NOTE — PROGRESS NOTES
[address] [facility]    CHIEF COMPLAINT:  Patient presents with: Follow - Up  Medication Follow-Up  Immunization/Injection: Flu Shot        HISTORY OF PRESENT ILLNESS:  Here for follow up. Hyperlipidemia: tolerating fenofibrate. TG improved.  Working on Calcium, Total 9.3 8.5 - 10.1 mg/dL    Calculated Osmolality 288 275 - 295 mOsm/kg    GFR, Non- 106 >=60    GFR, -American 122 >=60    AST 21 15 - 37 U/L    ALT 33 13 - 56 U/L    Alkaline Phosphatase 58 39 - 100 U/L    Bilirubin, PRESERVATIVE FREE 0.5 ML        Return for physical in July 2022. Diane Naranjo MD

## 2021-11-03 ENCOUNTER — MED REC SCAN ONLY (OUTPATIENT)
Dept: INTERNAL MEDICINE CLINIC | Facility: CLINIC | Age: 46
End: 2021-11-03

## 2021-11-19 ENCOUNTER — LAB ENCOUNTER (OUTPATIENT)
Dept: LAB | Age: 46
End: 2021-11-19
Attending: INTERNAL MEDICINE
Payer: COMMERCIAL

## 2021-11-19 DIAGNOSIS — Z01.818 PRE-OP TESTING: ICD-10-CM

## 2021-11-22 PROBLEM — K29.60 EROSIVE GASTRITIS: Status: ACTIVE | Noted: 2021-11-22

## 2021-11-22 PROBLEM — K64.8 INTERNAL HEMORRHOIDS: Status: ACTIVE | Noted: 2021-11-22

## 2021-11-22 PROBLEM — R10.11 RIGHT UPPER QUADRANT PAIN: Status: ACTIVE | Noted: 2021-11-22

## 2021-11-22 PROBLEM — K21.9 GERD (GASTROESOPHAGEAL REFLUX DISEASE): Status: ACTIVE | Noted: 2021-11-22

## 2021-11-22 PROBLEM — K29.60 GASTRITIS, BILE ACID REFLUX: Status: ACTIVE | Noted: 2021-11-22

## 2021-11-22 PROBLEM — Z12.11 SPECIAL SCREENING FOR MALIGNANT NEOPLASM OF COLON: Status: ACTIVE | Noted: 2021-11-22

## 2021-12-14 ENCOUNTER — HOSPITAL ENCOUNTER (OUTPATIENT)
Dept: NUCLEAR MEDICINE | Facility: HOSPITAL | Age: 46
Discharge: HOME | End: 2021-12-14
Attending: INTERNAL MEDICINE
Payer: COMMERCIAL

## 2021-12-14 DIAGNOSIS — R10.11 RUQ PAIN: ICD-10-CM

## 2021-12-14 PROCEDURE — 78227 HEPATOBIL SYST IMAGE W/DRUG: CPT | Performed by: INTERNAL MEDICINE

## 2021-12-21 ENCOUNTER — HOSPITAL ENCOUNTER (OUTPATIENT)
Dept: MAMMOGRAPHY | Age: 46
Discharge: HOME | End: 2021-12-21
Attending: INTERNAL MEDICINE
Payer: COMMERCIAL

## 2021-12-21 DIAGNOSIS — Z12.31 ENCOUNTER FOR SCREENING MAMMOGRAM FOR BREAST CANCER: ICD-10-CM

## 2021-12-21 PROCEDURE — 77063 BREAST TOMOSYNTHESIS BI: CPT | Performed by: INTERNAL MEDICINE

## 2021-12-21 PROCEDURE — 77067 SCR MAMMO BI INCL CAD: CPT | Performed by: INTERNAL MEDICINE

## 2022-01-28 ENCOUNTER — OFFICE VISIT (OUTPATIENT)
Dept: INTERNAL MEDICINE CLINIC | Facility: CLINIC | Age: 47
End: 2022-01-28
Payer: COMMERCIAL

## 2022-01-28 VITALS
DIASTOLIC BLOOD PRESSURE: 80 MMHG | OXYGEN SATURATION: 98 % | SYSTOLIC BLOOD PRESSURE: 110 MMHG | RESPIRATION RATE: 16 BRPM | BODY MASS INDEX: 30.44 KG/M2 | HEIGHT: 59 IN | TEMPERATURE: 98 F | WEIGHT: 151 LBS | HEART RATE: 78 BPM

## 2022-01-28 DIAGNOSIS — H65.92 OTITIS MEDIA, SEROUS, TM RUPTURE, LEFT: ICD-10-CM

## 2022-01-28 DIAGNOSIS — E78.5 HYPERLIPIDEMIA, UNSPECIFIED HYPERLIPIDEMIA TYPE: ICD-10-CM

## 2022-01-28 DIAGNOSIS — H72.92 OTITIS MEDIA, SEROUS, TM RUPTURE, LEFT: ICD-10-CM

## 2022-01-28 PROCEDURE — 99214 OFFICE O/P EST MOD 30 MIN: CPT | Performed by: INTERNAL MEDICINE

## 2022-01-28 PROCEDURE — 3008F BODY MASS INDEX DOCD: CPT | Performed by: INTERNAL MEDICINE

## 2022-01-28 PROCEDURE — 3079F DIAST BP 80-89 MM HG: CPT | Performed by: INTERNAL MEDICINE

## 2022-01-28 PROCEDURE — 3074F SYST BP LT 130 MM HG: CPT | Performed by: INTERNAL MEDICINE

## 2022-01-28 RX ORDER — FENOFIBRATE 67 MG/1
1 CAPSULE ORAL NIGHTLY
Qty: 90 CAPSULE | Refills: 1 | Status: SHIPPED | OUTPATIENT
Start: 2022-01-28

## 2022-01-28 NOTE — PROGRESS NOTES
[facility]    CHIEF COMPLAINT:  Patient presents with:  Ear Pain: Lt ear  Ringing In Ear: Lt ear. HISTORY OF PRESENT ILLNESS:  Complains of pain in left ear for about a week. She was blowing her nose and felt a pop in her left ear.  Since t Range    SARS-CoV-2 (Alinity) Not Detected Not Detected            ASSESSMENT AND PLAN:  1. Otitis media, serous, tm rupture, left  Refer to ent. If worsening pain go to the ER.   - ENT - INTERNAL    2.  Hyperlipidemia, unspecified hyperlipidemia type  -

## 2022-05-02 RX ORDER — FENOFIBRATE 67 MG/1
1 CAPSULE ORAL NIGHTLY
Qty: 90 CAPSULE | Refills: 0 | Status: SHIPPED | OUTPATIENT
Start: 2022-05-02

## 2022-09-26 DIAGNOSIS — E78.5 HYPERLIPIDEMIA, UNSPECIFIED HYPERLIPIDEMIA TYPE: ICD-10-CM

## 2022-09-29 DIAGNOSIS — E78.5 HYPERLIPIDEMIA, UNSPECIFIED HYPERLIPIDEMIA TYPE: ICD-10-CM

## 2022-09-29 RX ORDER — FENOFIBRATE 67 MG/1
1 CAPSULE ORAL NIGHTLY
Qty: 30 CAPSULE | Refills: 0 | Status: SHIPPED | OUTPATIENT
Start: 2022-09-29

## 2022-09-29 RX ORDER — FENOFIBRATE 67 MG/1
CAPSULE ORAL
Qty: 30 CAPSULE | Refills: 0 | OUTPATIENT
Start: 2022-09-29

## 2022-09-29 NOTE — TELEPHONE ENCOUNTER
Pt is due for pe, pended 30/0 to local pharmacy since we can [facility]ds [facility]   Last OV relevant to medication: 10/25/21  Last refill date:  5/2/22   #/refills:90/0  When pt was asked to return for OV: Return for physical in July 2022. Upcoming appt/reason: No future appointments.   Was pt informed of any over due labs: nothing overdue

## 2022-10-22 ENCOUNTER — OFFICE VISIT (OUTPATIENT)
Dept: INTERNAL MEDICINE CLINIC | Facility: CLINIC | Age: 47
End: 2022-10-22
Payer: COMMERCIAL

## 2022-10-22 VITALS
SYSTOLIC BLOOD PRESSURE: 122 MMHG | DIASTOLIC BLOOD PRESSURE: 80 MMHG | WEIGHT: 153.81 LBS | HEIGHT: 59.78 IN | BODY MASS INDEX: 30.2 KG/M2 | HEART RATE: 93 BPM | TEMPERATURE: 98 F | RESPIRATION RATE: 16 BRPM | OXYGEN SATURATION: 96 %

## 2022-10-22 DIAGNOSIS — Z00.00 PHYSICAL EXAM, ANNUAL: ICD-10-CM

## 2022-10-22 DIAGNOSIS — Z12.31 ENCOUNTER FOR SCREENING MAMMOGRAM FOR MALIGNANT NEOPLASM OF BREAST: ICD-10-CM

## 2022-10-22 DIAGNOSIS — E78.5 HYPERLIPIDEMIA, UNSPECIFIED HYPERLIPIDEMIA TYPE: ICD-10-CM

## 2022-10-22 DIAGNOSIS — Z23 NEED FOR VACCINATION: Primary | ICD-10-CM

## 2022-10-22 PROCEDURE — 3079F DIAST BP 80-89 MM HG: CPT | Performed by: INTERNAL MEDICINE

## 2022-10-22 PROCEDURE — 3008F BODY MASS INDEX DOCD: CPT | Performed by: INTERNAL MEDICINE

## 2022-10-22 PROCEDURE — 99396 PREV VISIT EST AGE 40-64: CPT | Performed by: INTERNAL MEDICINE

## 2022-10-22 PROCEDURE — 90471 IMMUNIZATION ADMIN: CPT | Performed by: INTERNAL MEDICINE

## 2022-10-22 PROCEDURE — 90686 IIV4 VACC NO PRSV 0.5 ML IM: CPT | Performed by: INTERNAL MEDICINE

## 2022-10-22 PROCEDURE — 3074F SYST BP LT 130 MM HG: CPT | Performed by: INTERNAL MEDICINE

## 2022-10-22 RX ORDER — FENOFIBRATE 67 MG/1
1 CAPSULE ORAL NIGHTLY
Qty: 90 CAPSULE | Refills: 0 | Status: SHIPPED | OUTPATIENT
Start: 2022-10-22

## 2022-12-16 ENCOUNTER — LAB ENCOUNTER (OUTPATIENT)
Dept: LAB | Age: 47
End: 2022-12-16
Attending: INTERNAL MEDICINE
Payer: COMMERCIAL

## 2022-12-16 DIAGNOSIS — Z00.00 PHYSICAL EXAM, ANNUAL: ICD-10-CM

## 2022-12-16 LAB
ALBUMIN SERPL-MCNC: 4.1 G/DL (ref 3.4–5)
ALBUMIN/GLOB SERPL: 1.1 {RATIO} (ref 1–2)
ALP LIVER SERPL-CCNC: 68 U/L
ALT SERPL-CCNC: 34 U/L
ANION GAP SERPL CALC-SCNC: 5 MMOL/L (ref 0–18)
AST SERPL-CCNC: 22 U/L (ref 15–37)
BASOPHILS # BLD AUTO: 0.07 X10(3) UL (ref 0–0.2)
BASOPHILS NFR BLD AUTO: 0.9 %
BILIRUB SERPL-MCNC: 0.6 MG/DL (ref 0.1–2)
BUN BLD-MCNC: 17 MG/DL (ref 7–18)
CALCIUM BLD-MCNC: 9.5 MG/DL (ref 8.5–10.1)
CHLORIDE SERPL-SCNC: 107 MMOL/L (ref 98–112)
CHOLEST SERPL-MCNC: 233 MG/DL (ref ?–200)
CO2 SERPL-SCNC: 27 MMOL/L (ref 21–32)
CREAT BLD-MCNC: 0.69 MG/DL
EOSINOPHIL # BLD AUTO: 0.26 X10(3) UL (ref 0–0.7)
EOSINOPHIL NFR BLD AUTO: 3.2 %
ERYTHROCYTE [DISTWIDTH] IN BLOOD BY AUTOMATED COUNT: 13.1 %
EST. AVERAGE GLUCOSE BLD GHB EST-MCNC: 114 MG/DL (ref 68–126)
FASTING PATIENT LIPID ANSWER: YES
FASTING STATUS PATIENT QL REPORTED: YES
GFR SERPLBLD BASED ON 1.73 SQ M-ARVRAT: 107 ML/MIN/1.73M2 (ref 60–?)
GLOBULIN PLAS-MCNC: 3.7 G/DL (ref 2.8–4.4)
GLUCOSE BLD-MCNC: 105 MG/DL (ref 70–99)
HBA1C MFR BLD: 5.6 % (ref ?–5.7)
HCT VFR BLD AUTO: 39.5 %
HDLC SERPL-MCNC: 48 MG/DL (ref 40–59)
HGB BLD-MCNC: 13 G/DL
IMM GRANULOCYTES # BLD AUTO: 0.02 X10(3) UL (ref 0–1)
IMM GRANULOCYTES NFR BLD: 0.2 %
LDLC SERPL CALC-MCNC: 155 MG/DL (ref ?–100)
LYMPHOCYTES # BLD AUTO: 3.9 X10(3) UL (ref 1–4)
LYMPHOCYTES NFR BLD AUTO: 47.8 %
MCH RBC QN AUTO: 30 PG (ref 26–34)
MCHC RBC AUTO-ENTMCNC: 32.9 G/DL (ref 31–37)
MCV RBC AUTO: 91.2 FL
MONOCYTES # BLD AUTO: 0.55 X10(3) UL (ref 0.1–1)
MONOCYTES NFR BLD AUTO: 6.7 %
NEUTROPHILS # BLD AUTO: 3.36 X10 (3) UL (ref 1.5–7.7)
NEUTROPHILS # BLD AUTO: 3.36 X10(3) UL (ref 1.5–7.7)
NEUTROPHILS NFR BLD AUTO: 41.2 %
NONHDLC SERPL-MCNC: 185 MG/DL (ref ?–130)
OSMOLALITY SERPL CALC.SUM OF ELEC: 290 MOSM/KG (ref 275–295)
PLATELET # BLD AUTO: 308 10(3)UL (ref 150–450)
POTASSIUM SERPL-SCNC: 3.5 MMOL/L (ref 3.5–5.1)
PROT SERPL-MCNC: 7.8 G/DL (ref 6.4–8.2)
RBC # BLD AUTO: 4.33 X10(6)UL
SODIUM SERPL-SCNC: 139 MMOL/L (ref 136–145)
TRIGL SERPL-MCNC: 167 MG/DL (ref 30–149)
TSI SER-ACNC: 3.62 MIU/ML (ref 0.36–3.74)
VLDLC SERPL CALC-MCNC: 32 MG/DL (ref 0–30)
WBC # BLD AUTO: 8.2 X10(3) UL (ref 4–11)

## 2022-12-16 PROCEDURE — 80061 LIPID PANEL: CPT | Performed by: INTERNAL MEDICINE

## 2022-12-16 PROCEDURE — 80050 GENERAL HEALTH PANEL: CPT | Performed by: INTERNAL MEDICINE

## 2022-12-16 PROCEDURE — 83036 HEMOGLOBIN GLYCOSYLATED A1C: CPT | Performed by: INTERNAL MEDICINE

## 2022-12-23 ENCOUNTER — HOSPITAL ENCOUNTER (OUTPATIENT)
Dept: MAMMOGRAPHY | Age: 47
Discharge: HOME | End: 2022-12-23
Attending: INTERNAL MEDICINE
Payer: COMMERCIAL

## 2022-12-23 DIAGNOSIS — Z12.31 ENCOUNTER FOR SCREENING MAMMOGRAM FOR MALIGNANT NEOPLASM OF BREAST: ICD-10-CM

## 2022-12-23 PROCEDURE — 77063 BREAST TOMOSYNTHESIS BI: CPT | Performed by: INTERNAL MEDICINE

## 2022-12-23 PROCEDURE — 77067 SCR MAMMO BI INCL CAD: CPT | Performed by: INTERNAL MEDICINE

## 2023-01-10 DIAGNOSIS — E78.5 HYPERLIPIDEMIA, UNSPECIFIED HYPERLIPIDEMIA TYPE: ICD-10-CM

## 2023-01-12 RX ORDER — FENOFIBRATE 67 MG/1
1 CAPSULE ORAL NIGHTLY
Qty: 90 CAPSULE | Refills: 0 | Status: SHIPPED | OUTPATIENT
Start: 2023-01-12

## 2023-04-19 DIAGNOSIS — E78.5 HYPERLIPIDEMIA, UNSPECIFIED HYPERLIPIDEMIA TYPE: ICD-10-CM

## 2023-04-19 RX ORDER — FENOFIBRATE 67 MG/1
1 CAPSULE ORAL NIGHTLY
Qty: 90 CAPSULE | Refills: 1 | Status: SHIPPED | OUTPATIENT
Start: 2023-04-19

## 2023-06-25 ENCOUNTER — HOSPITAL ENCOUNTER (OUTPATIENT)
Age: 48
Discharge: HOME | End: 2023-06-25
Payer: COMMERCIAL

## 2023-06-25 VITALS
TEMPERATURE: 98 F | HEART RATE: 85 BPM | RESPIRATION RATE: 18 BRPM | HEIGHT: 60 IN | SYSTOLIC BLOOD PRESSURE: 137 MMHG | WEIGHT: 150 LBS | DIASTOLIC BLOOD PRESSURE: 71 MMHG | OXYGEN SATURATION: 96 % | BODY MASS INDEX: 29.45 KG/M2

## 2023-06-25 DIAGNOSIS — H61.22 IMPACTED CERUMEN OF LEFT EAR: Primary | ICD-10-CM

## 2023-06-25 PROCEDURE — 69210 REMOVE IMPACTED EAR WAX UNI: CPT

## 2023-06-25 PROCEDURE — 99213 OFFICE O/P EST LOW 20 MIN: CPT

## 2023-06-25 RX ORDER — NEOMYCIN SULFATE, POLYMYXIN B SULFATE AND HYDROCORTISONE 10; 3.5; 1 MG/ML; MG/ML; [USP'U]/ML
4 SUSPENSION/ DROPS AURICULAR (OTIC) 4 TIMES DAILY
Qty: 10 ML | Refills: 0 | Status: SHIPPED | OUTPATIENT
Start: 2023-06-25 | End: 2023-07-02

## 2023-06-25 NOTE — DISCHARGE INSTRUCTIONS
Take antibiotics drop as prescribed  Go directly to emergency department for severe ear pain, sudden inability to hearing, swelling to your face behind ears or in your neck  You may also take ibuprofen or Tylenol for pain  Avoid swimming or submerging your head in water until you finish antibiotics  Avoid use of Q-tips

## 2023-06-25 NOTE — ED INITIAL ASSESSMENT (HPI)
C/o left ear pain for 1 day. Pt reports her ear was clogged and she used a cotton swab and wax was pushed back. Pt reports pain, ringing and Kake from left ear.

## 2023-08-18 DIAGNOSIS — E78.5 HYPERLIPIDEMIA, UNSPECIFIED HYPERLIPIDEMIA TYPE: ICD-10-CM

## 2023-08-22 RX ORDER — FENOFIBRATE 67 MG/1
1 CAPSULE ORAL NIGHTLY
Qty: 90 CAPSULE | Refills: 1 | OUTPATIENT
Start: 2023-08-22

## 2023-12-12 DIAGNOSIS — E78.5 HYPERLIPIDEMIA, UNSPECIFIED HYPERLIPIDEMIA TYPE: ICD-10-CM

## 2023-12-17 RX ORDER — FENOFIBRATE 67 MG/1
1 CAPSULE ORAL NIGHTLY
Qty: 90 CAPSULE | Refills: 0 | Status: SHIPPED | OUTPATIENT
Start: 2023-12-17 | End: 2024-01-22

## 2023-12-21 NOTE — TELEPHONE ENCOUNTER
LM for pt last physical was 10/22.    Please call office to schedule Physical in order to get future refills.

## 2024-01-08 ENCOUNTER — OFFICE VISIT (OUTPATIENT)
Dept: INTERNAL MEDICINE CLINIC | Facility: CLINIC | Age: 49
End: 2024-01-08
Payer: COMMERCIAL

## 2024-01-08 VITALS
OXYGEN SATURATION: 98 % | TEMPERATURE: 99 F | HEIGHT: 59.75 IN | WEIGHT: 154 LBS | RESPIRATION RATE: 18 BRPM | DIASTOLIC BLOOD PRESSURE: 86 MMHG | SYSTOLIC BLOOD PRESSURE: 136 MMHG | HEART RATE: 95 BPM | BODY MASS INDEX: 30.23 KG/M2

## 2024-01-08 DIAGNOSIS — Z12.31 ENCOUNTER FOR SCREENING MAMMOGRAM FOR MALIGNANT NEOPLASM OF BREAST: ICD-10-CM

## 2024-01-08 DIAGNOSIS — E78.5 HYPERLIPIDEMIA, UNSPECIFIED HYPERLIPIDEMIA TYPE: ICD-10-CM

## 2024-01-08 DIAGNOSIS — L30.9 DERMATITIS: ICD-10-CM

## 2024-01-08 DIAGNOSIS — Z00.00 PHYSICAL EXAM, ANNUAL: Primary | ICD-10-CM

## 2024-01-08 DIAGNOSIS — R59.9 ENLARGED LYMPH NODE: ICD-10-CM

## 2024-01-08 RX ORDER — MOMETASONE FUROATE 1 MG/G
1 CREAM TOPICAL DAILY
Qty: 60 G | Refills: 0 | Status: SHIPPED | OUTPATIENT
Start: 2024-01-08

## 2024-01-08 NOTE — PROGRESS NOTES
[facility]    CHIEF COMPLAINT:   Chief Complaint   Patient presents with    Routine Physical     Sees gyne. 10/29/18-pap. 12/23/22-mammo. Has appt for mammo tomorrow.     Influenza     Refused           HPI:   Jonathan Barba is a 48 year old female who presents for a complete physical exam. Symptoms: denies discharge, itching, burning or dysuria.     Pap due. Done 10/2018. Has not seen gyne. Used to see dr. Ga. Would like to get her pap here.      Mammo scheduled. Last ordered by me. Needs another order.      Colonoscopy done 11/2021, repeat in 10 years. No polyps.      Up to date tdap.   Due covid booster. Get at her local pharmacy.      Exercise: aerobic and santosh 5 times a week.      Derm: no concerning moles.      Also yearly med check.   On fenofibrate for elevated TG. Tolerating well. No muscle aches. Will need labs.     She feels a lump on her right saw just in front of the ear. No pain. Has been there for 3-4 months. No change in size.     Has dry skin and flaky skin on fingers in the winter. Get irritated with warm water or soap.     Wt Readings from Last 6 Encounters:   01/08/24 154 lb (69.9 kg)   10/27/23 157 lb 9.6 oz (71.5 kg)   06/25/23 150 lb (68 kg)   11/16/22 156 lb (70.8 kg)   10/22/22 153 lb 12.8 oz (69.8 kg)   01/28/22 151 lb (68.5 kg)     Body mass index is 30.33 kg/m².       Current Outpatient Medications   Medication Sig Dispense Refill    Mometasone Furoate 0.1 % External Cream Apply 1 Application topically daily. 60 g 0    Fenofibrate 67 MG Oral Cap Take 1 capsule by mouth nightly. 90 capsule 0    omeprazole 20 MG Oral Capsule Delayed Release TAKE 1 CAPSULE DAILY 30 MINUTES PRIOR TO BREAKFAST 90 capsule 3    Calcium Carbonate-Vitamin D (CALCIUM + D OR) Take by mouth daily.      Ascorbic Acid (VITAMIN C GUMMIE OR) Take by mouth daily.          Past Medical History:   Diagnosis Date    Abnormal uterine bleeding     Amenorrhea     Back pain 07/2021    Comes and  goes    Dysmenorrhea     Endometriosis 2010    Esophageal reflux Maybe    On and off    Fibroids     Food intolerance 2018    Mild Lactose intolerance    Heartburn 2018    High cholesterol 2019    Hyperlipidemia     Menses painful     Comes and goes    Myoma     uterine    Night sweats Early October    Maybe due to menopause    Scoliosis     Wears glasses     For computer use or near reading only    Weight gain 2016      History reviewed. No pertinent surgical history.   Family History   Problem Relation Age of Onset    Diabetes Father             Heart Disorder Mother     Breast Cancer Maternal Grandmother 80    No Known Problems Sister     No Known Problems Brother     No Known Problems Brother       Social History:   Social History     Socioeconomic History    Marital status:    Tobacco Use    Smoking status: Never     Passive exposure: Never    Smokeless tobacco: Never   Vaping Use    Vaping Use: Never used   Substance and Sexual Activity    Alcohol use: Never    Drug use: Never    Sexual activity: Never   Other Topics Concern    Caffeine Concern Yes     Comment: 1 cup of tea twice a week    Stress Concern No    Weight Concern Yes     Comment: Currently doing light exercises    Special Diet No    Exercise Yes     Comment: 5x a week walking    Seat Belt Yes     Occ: . : yes.    Exercise: walking.  Diet: watches minimally     REVIEW OF SYSTEMS:   GENERAL: feels well otherwise  SKIN: denies any unusual skin lesions  EYES:denies blurred vision or double vision  HEENT: denies nasal congestion, sinus pain or ST  LUNGS: denies shortness of breath with exertion  CARDIOVASCULAR: denies chest pain on exertion  GI: denies abdominal pain,denies heartburn  : denies dysuria, vaginal discharge or itching  MUSCULOSKELETAL: denies back pain  NEURO: denies headaches  PSYCHE: denies depression or anxiety  HEMATOLOGIC: denies hx of anemia  ENDOCRINE: denies thyroid history  ALL/ASTHMA: denies  hx of allergy or asthma    EXAM:   /86   Pulse 95   Temp 98.6 °F (37 °C)   Resp 18   Ht 4' 11.75\" (1.518 m)   Wt 154 lb (69.9 kg)   SpO2 98%   BMI 30.33 kg/m²   Body mass index is 30.33 kg/m².   GENERAL: well developed, well nourished,in no apparent distress  SKIN: no rashes,no suspicious lesions  HEENT: atraumatic, normocephalic,ears and throat are clear  EYES:PERRLA, conjunctiva are clear  NECK: supple,no adenopathy. Has a palpable pre auricular lymph node on the right. Not tender.   CHEST: no chest tenderness  LUNGS: clear to auscultation  CARDIO: nl s1 and s2, RRR without murmur  GI: good BS's,no masses, HSM or tenderness  BREAST: no dominant or suspicious mass, no nipple discharge  GENITAL/URINARY:  pelvic exam attempted today. Unable to complete as pt not able to tolerate.   MUSCULOSKELETAL: back is not tender,FROM of the back  EXTREMITIES: no cyanosis, clubbing or edema  NEURO: Oriented times three,cranial nerves are intact,motor and sensory are grossly intact    Labs:   Lab Results   Component Value Date/Time    WBC 8.2 12/16/2022 07:11 AM    HGB 13.0 12/16/2022 07:11 AM    .0 12/16/2022 07:11 AM      Lab Results   Component Value Date/Time     (H) 12/16/2022 07:11 AM     12/16/2022 07:11 AM    K 3.5 12/16/2022 07:11 AM     12/16/2022 07:11 AM    CO2 27.0 12/16/2022 07:11 AM    CREATSERUM 0.69 12/16/2022 07:11 AM    CA 9.5 12/16/2022 07:11 AM    ALB 4.1 12/16/2022 07:11 AM    TP 7.8 12/16/2022 07:11 AM    ALKPHO 68 12/16/2022 07:11 AM    AST 22 12/16/2022 07:11 AM    ALT 34 12/16/2022 07:11 AM    BILT 0.6 12/16/2022 07:11 AM    TSH 3.620 12/16/2022 07:11 AM        Lab Results   Component Value Date/Time    CHOLEST 233 (H) 12/16/2022 07:11 AM    HDL 48 12/16/2022 07:11 AM    TRIG 167 (H) 12/16/2022 07:11 AM     (H) 12/16/2022 07:11 AM    NONHDLC 185 (H) 12/16/2022 07:11 AM       Lab Results   Component Value Date/Time    A1C 5.6 12/16/2022 07:11 AM      Vitamin D:     No results found for: \"VITD\"        ASSESSMENT AND PLAN:   Jonathan Albertalta is a 48 year old female who presents for a complete physical exam.   1. Physical exam, annual  Do labs.   See gyne for pap. Unable to tolerate here.   Immunizations discussed.   Continue regular exercise.   - CBC With Differential With Platelet; Future  - Comp Metabolic Panel; Future  - Lipid Panel; Future  - TSH W Reflex To Free T4; Future    2. Encounter for screening mammogram for malignant neoplasm of breast  - [facility] TRAVIS 2D+3D SCREENING BILAT (CPT=77067/06729); Future    3. Dermatitis  - Mometasone Furoate 0.1 % External Cream; Apply 1 Application topically daily.  Dispense: 60 g; Refill: 0  Wear gloves, avoid hot water and soap.     4. Hyperlipidemia, unspecified hyperlipidemia type  Continue fenofibrate.     5. Enlarged lymph node  Check us.   - US HEAD/NECK (CPT=76536); Future        Return in about 1 year (around 1/8/2025) for physical.      Arabella Santoyo MD

## 2024-01-09 ENCOUNTER — HOSPITAL ENCOUNTER (OUTPATIENT)
Dept: MAMMOGRAPHY | Facility: HOSPITAL | Age: 49
Discharge: HOME | End: 2024-01-09
Attending: INTERNAL MEDICINE
Payer: COMMERCIAL

## 2024-01-09 DIAGNOSIS — Z12.31 ENCOUNTER FOR SCREENING MAMMOGRAM FOR MALIGNANT NEOPLASM OF BREAST: ICD-10-CM

## 2024-01-09 PROCEDURE — 77063 BREAST TOMOSYNTHESIS BI: CPT | Performed by: INTERNAL MEDICINE

## 2024-01-09 PROCEDURE — 77067 SCR MAMMO BI INCL CAD: CPT | Performed by: INTERNAL MEDICINE

## 2024-01-15 ENCOUNTER — LAB ENCOUNTER (OUTPATIENT)
Dept: LAB | Age: 49
End: 2024-01-15
Attending: INTERNAL MEDICINE
Payer: COMMERCIAL

## 2024-01-15 DIAGNOSIS — Z00.00 PHYSICAL EXAM, ANNUAL: ICD-10-CM

## 2024-01-15 LAB
ALBUMIN SERPL-MCNC: 3.8 G/DL (ref 3.4–5)
ALBUMIN/GLOB SERPL: 0.9 {RATIO} (ref 1–2)
ALP LIVER SERPL-CCNC: 65 U/L
ALT SERPL-CCNC: 26 U/L
ANION GAP SERPL CALC-SCNC: 5 MMOL/L (ref 0–18)
AST SERPL-CCNC: 15 U/L (ref 15–37)
BASOPHILS # BLD AUTO: 0.08 X10(3) UL (ref 0–0.2)
BASOPHILS NFR BLD AUTO: 0.9 %
BILIRUB SERPL-MCNC: 0.4 MG/DL (ref 0.1–2)
BUN BLD-MCNC: 14 MG/DL (ref 9–23)
CALCIUM BLD-MCNC: 9.3 MG/DL (ref 8.5–10.1)
CHLORIDE SERPL-SCNC: 109 MMOL/L (ref 98–112)
CHOLEST SERPL-MCNC: 199 MG/DL (ref ?–200)
CO2 SERPL-SCNC: 27 MMOL/L (ref 21–32)
CREAT BLD-MCNC: 0.66 MG/DL
EGFRCR SERPLBLD CKD-EPI 2021: 107 ML/MIN/1.73M2 (ref 60–?)
EOSINOPHIL # BLD AUTO: 0.23 X10(3) UL (ref 0–0.7)
EOSINOPHIL NFR BLD AUTO: 2.6 %
ERYTHROCYTE [DISTWIDTH] IN BLOOD BY AUTOMATED COUNT: 12.8 %
FASTING PATIENT LIPID ANSWER: YES
FASTING STATUS PATIENT QL REPORTED: YES
GLOBULIN PLAS-MCNC: 4.2 G/DL (ref 2.8–4.4)
GLUCOSE BLD-MCNC: 107 MG/DL (ref 70–99)
HCT VFR BLD AUTO: 40.9 %
HDLC SERPL-MCNC: 47 MG/DL (ref 40–59)
HGB BLD-MCNC: 13.3 G/DL
IMM GRANULOCYTES # BLD AUTO: 0.02 X10(3) UL (ref 0–1)
IMM GRANULOCYTES NFR BLD: 0.2 %
LDLC SERPL CALC-MCNC: 108 MG/DL (ref ?–100)
LYMPHOCYTES # BLD AUTO: 4.39 X10(3) UL (ref 1–4)
LYMPHOCYTES NFR BLD AUTO: 49.3 %
MCH RBC QN AUTO: 29.4 PG (ref 26–34)
MCHC RBC AUTO-ENTMCNC: 32.5 G/DL (ref 31–37)
MCV RBC AUTO: 90.3 FL
MONOCYTES # BLD AUTO: 0.6 X10(3) UL (ref 0.1–1)
MONOCYTES NFR BLD AUTO: 6.7 %
NEUTROPHILS # BLD AUTO: 3.58 X10 (3) UL (ref 1.5–7.7)
NEUTROPHILS # BLD AUTO: 3.58 X10(3) UL (ref 1.5–7.7)
NEUTROPHILS NFR BLD AUTO: 40.3 %
NONHDLC SERPL-MCNC: 152 MG/DL (ref ?–130)
OSMOLALITY SERPL CALC.SUM OF ELEC: 293 MOSM/KG (ref 275–295)
PLATELET # BLD AUTO: 283 10(3)UL (ref 150–450)
POTASSIUM SERPL-SCNC: 4.3 MMOL/L (ref 3.5–5.1)
PROT SERPL-MCNC: 8 G/DL (ref 6.4–8.2)
RBC # BLD AUTO: 4.53 X10(6)UL
SODIUM SERPL-SCNC: 141 MMOL/L (ref 136–145)
TRIGL SERPL-MCNC: 255 MG/DL (ref 30–149)
TSI SER-ACNC: 2.34 MIU/ML (ref 0.36–3.74)
VLDLC SERPL CALC-MCNC: 44 MG/DL (ref 0–30)
WBC # BLD AUTO: 8.9 X10(3) UL (ref 4–11)

## 2024-01-15 PROCEDURE — 80061 LIPID PANEL: CPT

## 2024-01-15 PROCEDURE — 84443 ASSAY THYROID STIM HORMONE: CPT

## 2024-01-15 PROCEDURE — 80053 COMPREHEN METABOLIC PANEL: CPT

## 2024-01-15 PROCEDURE — 85025 COMPLETE CBC W/AUTO DIFF WBC: CPT

## 2024-01-17 ENCOUNTER — HOSPITAL ENCOUNTER (OUTPATIENT)
Dept: ULTRASOUND IMAGING | Age: 49
Discharge: HOME | End: 2024-01-17
Attending: INTERNAL MEDICINE
Payer: COMMERCIAL

## 2024-01-17 DIAGNOSIS — R59.9 ENLARGED LYMPH NODE: ICD-10-CM

## 2024-01-17 PROCEDURE — 76536 US EXAM OF HEAD AND NECK: CPT | Performed by: INTERNAL MEDICINE

## 2024-01-21 ENCOUNTER — PATIENT MESSAGE (OUTPATIENT)
Dept: INTERNAL MEDICINE CLINIC | Facility: CLINIC | Age: 49
End: 2024-01-21

## 2024-01-21 DIAGNOSIS — E78.5 HYPERLIPIDEMIA, UNSPECIFIED HYPERLIPIDEMIA TYPE: ICD-10-CM

## 2024-01-22 RX ORDER — FENOFIBRATE 145 MG/1
145 TABLET, COATED ORAL DAILY
Qty: 90 TABLET | Refills: 1 | Status: SHIPPED | OUTPATIENT
Start: 2024-01-22

## 2024-01-22 RX ORDER — FENOFIBRATE 67 MG/1
1 CAPSULE ORAL NIGHTLY
Qty: 90 CAPSULE | Refills: 3 | Status: CANCELLED
Start: 2024-01-22

## 2024-01-22 NOTE — TELEPHONE ENCOUNTER
From: Jonathan Barba  To: Arabella Santoyo  Sent: 1/21/2024 7:03 AM CST  Subject: US Head/Neck result    Hi Dr Santoyo,   A nurse from your office called and mentioned about a referral after my US of Head/Neck. I don’t see the referral sent in My Chart. Please send it so I can check my options. Thank you

## 2024-01-22 NOTE — TELEPHONE ENCOUNTER
Last OV relevant to medication: 1/8/24  Last refill date: 12/17/23 #90/refills: 0  When pt was asked to return for OV: 1/8/25  Upcoming appt/reason: No future appointments.  Was pt informed of any over due labs: utd  Lab Results   Component Value Date     (H) 01/15/2024    BUN 14 01/15/2024    BUNCREA 23.1 (H) 07/10/2021    CREATSERUM 0.66 01/15/2024    ANIONGAP 5 01/15/2024    GFRNAA 106 10/16/2021    GFRAA 122 10/16/2021    CA 9.3 01/15/2024    OSMOCALC 293 01/15/2024    ALKPHO 65 01/15/2024    AST 15 01/15/2024    ALT 26 01/15/2024    BILT 0.4 01/15/2024    TP 8.0 01/15/2024    ALB 3.8 01/15/2024    GLOBULIN 4.2 01/15/2024     01/15/2024    K 4.3 01/15/2024     01/15/2024    CO2 27.0 01/15/2024     Please see pt message regarding dose- pended previous order. Thank you.

## 2024-01-22 NOTE — TELEPHONE ENCOUNTER
From: Jonathan Barba  To: Arabella Santoyo  Sent: 1/21/2024 7:09 AM CST  Subject: Febofibrate    Hi again Dr. Santoyo!   You mentioned during my annual visit last Jan.9 that you’d refill Fenofibrate after the blood work. Will it be the same mg? or higher?

## 2024-01-22 NOTE — TELEPHONE ENCOUNTER
Increase fenofibrate to 145mg daily. Rx done.   Recheck lipid and cmp in 3 months.   Please order and inform pt.

## 2025-01-07 DIAGNOSIS — E78.5 HYPERLIPIDEMIA, UNSPECIFIED HYPERLIPIDEMIA TYPE: ICD-10-CM

## 2025-01-09 RX ORDER — FENOFIBRATE 145 MG/1
145 TABLET, COATED ORAL DAILY
Qty: 30 TABLET | Refills: 0 | Status: SHIPPED | OUTPATIENT
Start: 2025-01-09

## 2025-01-09 NOTE — TELEPHONE ENCOUNTER
Last OV relevant to medication: 1/8/24  Last refill date: 1/22/24 #90/refills: 1  When pt was asked to return for OV: 1/8/25  Upcoming appt/reason: No future appointments.  Was pt informed of any over due labs: overdue  Lab Results   Component Value Date    CHOLEST 199 01/15/2024    TRIG 255 (H) 01/15/2024    HDL 47 01/15/2024     (H) 01/15/2024    VLDL 44 (H) 01/15/2024    TCHDLRATIO 4.10 01/25/2018    NONHDLC 152 (H) 01/15/2024     Pt due for annual physical exam please call to schedule thanks!

## 2025-02-07 DIAGNOSIS — E78.5 HYPERLIPIDEMIA, UNSPECIFIED HYPERLIPIDEMIA TYPE: ICD-10-CM

## 2025-02-10 RX ORDER — FENOFIBRATE 145 MG/1
145 TABLET, COATED ORAL DAILY
Qty: 30 TABLET | Refills: 0 | Status: SHIPPED | OUTPATIENT
Start: 2025-02-10

## 2025-03-13 DIAGNOSIS — E78.5 HYPERLIPIDEMIA, UNSPECIFIED HYPERLIPIDEMIA TYPE: ICD-10-CM

## 2025-03-13 RX ORDER — FENOFIBRATE 145 MG/1
145 TABLET, COATED ORAL DAILY
Qty: 30 TABLET | Refills: 0 | Status: SHIPPED | OUTPATIENT
Start: 2025-03-13

## 2025-03-13 NOTE — TELEPHONE ENCOUNTER
Last OV relevant to medication: 1/8/24, see mcm encounter from 1/21/24   Last refill date: 2/10     #/refills: 30/0   When pt was asked to return for OV: Return in about 1 year (around 1/8/2025) for physical.    Upcoming appt/reason:   Future Appointments   Date Time Provider Department Center   3/17/2025  3:20 PM Anel Larsen MD [facility]       Was pt informed of any over due labs: overdue; mcm sent in past but not read it appears , mcm sent    Arabella Santoyo MD       1/22/24  4:01 PM  Note  Increase fenofibrate to 145mg daily. Rx done.   Recheck lipid and cmp in 3 months.   Please order and inform pt.            Lab Results   Component Value Date     (H) 01/15/2024    BUN 14 01/15/2024    BUNCREA 23.1 (H) 07/10/2021    CREATSERUM 0.66 01/15/2024    ANIONGAP 5 01/15/2024    GFRNAA 106 10/16/2021    GFRAA 122 10/16/2021    CA 9.3 01/15/2024    OSMOCALC 293 01/15/2024    ALKPHO 65 01/15/2024    AST 15 01/15/2024    ALT 26 01/15/2024    BILT 0.4 01/15/2024    TP 8.0 01/15/2024    ALB 3.8 01/15/2024    GLOBULIN 4.2 01/15/2024     01/15/2024    K 4.3 01/15/2024     01/15/2024    CO2 27.0 01/15/2024       Lab Results   Component Value Date    CHOLEST 199 01/15/2024    TRIG 255 (H) 01/15/2024    HDL 47 01/15/2024     (H) 01/15/2024    VLDL 44 (H) 01/15/2024    TCHDLRATIO 4.10 01/25/2018    NONHDLC 152 (H) 01/15/2024

## 2025-03-17 ENCOUNTER — OFFICE VISIT (OUTPATIENT)
Dept: INTERNAL MEDICINE CLINIC | Facility: CLINIC | Age: 50
End: 2025-03-17
Payer: COMMERCIAL

## 2025-03-17 VITALS
RESPIRATION RATE: 20 BRPM | SYSTOLIC BLOOD PRESSURE: 136 MMHG | HEIGHT: 59.45 IN | DIASTOLIC BLOOD PRESSURE: 70 MMHG | TEMPERATURE: 98 F | OXYGEN SATURATION: 98 % | WEIGHT: 152 LBS | HEART RATE: 76 BPM | BODY MASS INDEX: 30.24 KG/M2

## 2025-03-17 DIAGNOSIS — R10.11 RUQ PAIN: ICD-10-CM

## 2025-03-17 DIAGNOSIS — R59.1 LYMPHADENOPATHY OF HEAD AND NECK: ICD-10-CM

## 2025-03-17 DIAGNOSIS — Z00.00 PHYSICAL EXAM, ANNUAL: Primary | ICD-10-CM

## 2025-03-17 DIAGNOSIS — G89.29 CHRONIC RIGHT-SIDED LOW BACK PAIN WITH RIGHT-SIDED SCIATICA: ICD-10-CM

## 2025-03-17 DIAGNOSIS — M54.41 CHRONIC RIGHT-SIDED LOW BACK PAIN WITH RIGHT-SIDED SCIATICA: ICD-10-CM

## 2025-03-17 DIAGNOSIS — Z13.220 SCREENING FOR LIPID DISORDERS: ICD-10-CM

## 2025-03-17 DIAGNOSIS — M41.9 SCOLIOSIS, UNSPECIFIED SCOLIOSIS TYPE, UNSPECIFIED SPINAL REGION: ICD-10-CM

## 2025-03-17 DIAGNOSIS — Z13.228 SCREENING FOR ENDOCRINE, METABOLIC AND IMMUNITY DISORDER: ICD-10-CM

## 2025-03-17 DIAGNOSIS — N94.10 DYSPAREUNIA IN FEMALE: ICD-10-CM

## 2025-03-17 DIAGNOSIS — Z12.31 ENCOUNTER FOR SCREENING MAMMOGRAM FOR MALIGNANT NEOPLASM OF BREAST: ICD-10-CM

## 2025-03-17 DIAGNOSIS — Z13.0 SCREENING FOR ENDOCRINE, METABOLIC AND IMMUNITY DISORDER: ICD-10-CM

## 2025-03-17 DIAGNOSIS — Z13.0 SCREENING FOR DEFICIENCY ANEMIA: ICD-10-CM

## 2025-03-17 DIAGNOSIS — Z13.29 SCREENING FOR ENDOCRINE, METABOLIC AND IMMUNITY DISORDER: ICD-10-CM

## 2025-03-17 PROBLEM — K29.60 GASTRITIS, BILE ACID REFLUX: Status: RESOLVED | Noted: 2021-11-22 | Resolved: 2025-03-17

## 2025-03-17 PROBLEM — K21.9 GASTROESOPHAGEAL REFLUX DISEASE: Status: RESOLVED | Noted: 2021-10-19 | Resolved: 2025-03-17

## 2025-03-17 PROBLEM — K29.60 EROSIVE GASTRITIS: Status: RESOLVED | Noted: 2021-11-22 | Resolved: 2025-03-17

## 2025-03-17 NOTE — PATIENT INSTRUCTIONS
Bean-O supplement for gas and stomach.   RUQ ultrasound to evaluated gallbladder.   ENT evaluation for neck lymph nodes.   Repeat neck ultrasound prior to ENT visit so ENT had information about lymph nodes.

## 2025-03-17 NOTE — PROGRESS NOTES
CHIEF COMPLAINT:   Chief Complaint   Patient presents with    Routine Physical     No Gyne last pap 10/29/18 1/9/24 Mammo, 11/21/22 Colon 10 years        HPI , Assessment & Plan:   Jonathan Barba is a 49 year old female who presents for a complete physical exam.     Wt Readings from Last 6 Encounters:   03/17/25 152 lb (68.9 kg)   01/08/24 154 lb (69.9 kg)   10/27/23 157 lb 9.6 oz (71.5 kg)   06/25/23 150 lb (68 kg)   11/16/22 156 lb (70.8 kg)   10/22/22 153 lb 12.8 oz (69.8 kg)     Body mass index is 30.24 kg/m².     //GERD with gastritis without hemorrhage  Not taking omperazole, only when having symptoms. Main symptom is nausea.   PLAN:   -Omeprazole 20mg daily PRN     //Borborygmi  Notes bowel sounds are more prominent. Noted more in between 11-2 and 2-4 after breakfast and lunch. Notes feeling really gassy. Does not take probiotics. Denies diarrhea. Does report hx of constipation once a week. Has BM every other day. Was eating fresh vegetable and fruits in [facility] and was not a problem. Denies RUQ pain.   PLAN:   Bean-o over the counter.   Abdominal ultrasound ordered to evaluate gallbladder pathology.     //Chronic constipation  //Internal Hemorrhoids  Denies blood in her stool. Still dealing with hemorrhoids.   PLAN:   CTM. Discussed high fiber diet.     //Neck lymphadenopathy   PLAN:   On my exam I am noting enlargement of the lymph nodes that she demosntrated in January of 2024. Imaging at the time showed small 1cm lymph nodes. But today, especilly the periauricular, it is quite enlarged. Recommend repeat neck ultrasound and ENT Eval as I forsee biopsy potentially.   -ENT evaluation recommended.   -Neck ultrasound ordered.     //Hypertriglyceridemia  //HLD   PLAN:   -Fenofibrate 145mg daily.   -Recheck lipid panel.   The 10-year ASCVD risk score (Deb RIOS, et al., 2019) is: 1.7%    Values used to calculate the score:      Age: 49 years      Sex: Female      Is Non-   American: No      Diabetic: No      Tobacco smoker: No      Systolic Blood Pressure: 136 mmHg      Is BP treated: No      HDL Cholesterol: 47 mg/dL      Total Cholesterol: 199 mg/dL    //Post-menopausal  No periods for last year.   PLAN:   -Continue calcium and vitamin D supplement.     //Chronic back pain   //Hx of Scoliosis   Has seen chiropracter, but no difference.  PLAN:   -PT referral     //Hx of uterine firboids           HEALTH MAINTENANCE:   -Vaccinations: Prevnar Due, Shingrix UTD, Flu Done, COVID Done  -Colonoscopy: 11/22/2021 due in 10 years.   -Mammogram: 01/09/2024  -Pap Smear: - Refused. Would like to see gyn.   -Diabetes screening: Last A1c value was 5.6% done 12/16/2022.  -Lipid screening: Cholesterol: 199, done on 1/15/2024.  HDL Cholesterol: 47, done on 1/15/2024.  TriGlycerides 255, done on 1/15/2024.  LDL Cholesterol: 108, done on 1/15/2024.   -Birth Control: post menopausal  -Smoking: Denies  -Alcohol: Denies  -Marijuana: Denies  -Recreational drug: Denies    Return in about 6 months (around 9/17/2025) for Medication check.    Anel Larsen MD   Internal Medicine     Current Outpatient Medications   Medication Sig Dispense Refill    FENOFIBRATE 145 MG Oral Tab TAKE 1 TABLET BY MOUTH EVERY DAY 30 tablet 0    omeprazole 20 MG Oral Capsule Delayed Release TAKE 1 CAPSULE DAILY 30 MINUTES PRIOR TO BREAKFAST 90 capsule 0    Calcium Carbonate-Vitamin D (CALCIUM + D OR) Take by mouth daily.        Past Medical History:    Abnormal uterine bleeding    Amenorrhea    Back pain    Comes and goes    Dysmenorrhea    Endometriosis    Esophageal reflux    On and off    Fibroids    Food intolerance    Mild Lactose intolerance    Gastric Erosion W/O hem    Gastritis    Gastroesophageal reflux disease    Heartburn    High cholesterol    Hyperlipidemia    Menses painful    Comes and goes    Myoma    uterine    Night sweats    Maybe due to menopause    Right upper quadrant pain    Scoliosis     Wears glasses    For computer use or near reading only    Weight gain      History reviewed. No pertinent surgical history.   Family History   Problem Relation Age of Onset    Heart Disorder Mother     Diabetes Father             No Known Problems Sister     No Known Problems Brother     Other (pneumonia) Brother     Breast Cancer Maternal Grandmother 80      Social History:   Social History     Socioeconomic History    Marital status:    Tobacco Use    Smoking status: Never     Passive exposure: Never    Smokeless tobacco: Never   Vaping Use    Vaping status: Never Used   Substance and Sexual Activity    Alcohol use: Never    Drug use: Never    Sexual activity: Never   Other Topics Concern    Caffeine Concern Yes     Comment: 1 cup of tea twice a week    Stress Concern No    Weight Concern Yes     Comment: Currently doing light exercises    Special Diet No    Exercise Yes     Comment: 5x a week walking    Seat Belt Yes     Social Drivers of Health     Food Insecurity: No Food Insecurity (3/17/2025)    NCSS - Food Insecurity     Worried About Running Out of Food in the Last Year: No     Ran Out of Food in the Last Year: No   Transportation Needs: No Transportation Needs (3/17/2025)    NCSS - Transportation     Lack of Transportation: No   Housing Stability: Not At Risk (3/17/2025)    NCSS - Housing/Utilities     Has Housing: Yes     Worried About Losing Housing: No     Unable to Get Utilities: No        REVIEW OF SYSTEMS:   Negative except for what is mentioned in HPI.     Screenings:   1.    2.    3.    4.    5.    6.    7.    8.    9.               EXAM:   /70 (BP Location: Right arm, Patient Position: Sitting, Cuff Size: adult)   Pulse 76   Temp 98 °F (36.7 °C) (Temporal)   Resp 20   Ht 4' 11.45\" (1.51 m)   Wt 152 lb (68.9 kg)   SpO2 98%   BMI 30.24 kg/m²   Body mass index is 30.24 kg/m².   GENERAL: well developed, well nourished,in no apparent distress  SKIN: no rashes,no  suspicious lesions  HEENT: atraumatic, normocephalic,ears and throat are clear  EYES:PERRLA, conjunctiva are clear  NECK: Appreciate lymphadenopathy of the R side of the neck and the R facial area near the periauricullar area.   CHEST: no chest tenderness  LUNGS: clear to auscultation  CARDIO: nl s1 and s2, RRR without murmur  GI: good BS's,no masses, HSM or tenderness  BREAST: deferred  GENITAL/URINARY: deferred  MUSCULOSKELETAL: back is not tender,FROM of the back  EXTREMITIES: no cyanosis, clubbing or edema  NEURO: Oriented times three,cranial nerves are intact,motor and sensory are grossly intact    Labs:   Lab Results   Component Value Date/Time    WBC 8.9 01/15/2024 07:39 AM    HGB 13.3 01/15/2024 07:39 AM    .0 01/15/2024 07:39 AM      Lab Results   Component Value Date/Time     (H) 01/15/2024 07:39 AM     01/15/2024 07:39 AM    K 4.3 01/15/2024 07:39 AM     01/15/2024 07:39 AM    CO2 27.0 01/15/2024 07:39 AM    CREATSERUM 0.66 01/15/2024 07:39 AM    CA 9.3 01/15/2024 07:39 AM    ALB 3.8 01/15/2024 07:39 AM    TP 8.0 01/15/2024 07:39 AM    ALKPHO 65 01/15/2024 07:39 AM    AST 15 01/15/2024 07:39 AM    ALT 26 01/15/2024 07:39 AM    BILT 0.4 01/15/2024 07:39 AM    TSH 2.340 01/15/2024 07:39 AM        Lab Results   Component Value Date/Time    CHOLEST 199 01/15/2024 07:39 AM    HDL 47 01/15/2024 07:39 AM    TRIG 255 (H) 01/15/2024 07:39 AM     (H) 01/15/2024 07:39 AM    NONHDLC 152 (H) 01/15/2024 07:39 AM       Lab Results   Component Value Date/Time    A1C 5.6 12/16/2022 07:11 AM        No results found for: \"VITD\"      Imaging:   No results found.   Initial (On Arrival)

## 2025-03-24 ENCOUNTER — HOSPITAL ENCOUNTER (OUTPATIENT)
Dept: MAMMOGRAPHY | Age: 50
Discharge: HOME | End: 2025-03-24
Attending: STUDENT IN AN ORGANIZED HEALTH CARE EDUCATION/TRAINING PROGRAM
Payer: COMMERCIAL

## 2025-03-24 DIAGNOSIS — Z12.31 ENCOUNTER FOR SCREENING MAMMOGRAM FOR MALIGNANT NEOPLASM OF BREAST: ICD-10-CM

## 2025-03-24 PROBLEM — N94.10 DYSPAREUNIA IN FEMALE: Status: ACTIVE | Noted: 2025-03-24

## 2025-03-24 PROBLEM — M54.41 CHRONIC RIGHT-SIDED LOW BACK PAIN WITH RIGHT-SIDED SCIATICA: Status: ACTIVE | Noted: 2025-03-24

## 2025-03-24 PROBLEM — G89.29 CHRONIC RIGHT-SIDED LOW BACK PAIN WITH RIGHT-SIDED SCIATICA: Status: ACTIVE | Noted: 2025-03-24

## 2025-03-24 PROCEDURE — 77067 SCR MAMMO BI INCL CAD: CPT | Performed by: STUDENT IN AN ORGANIZED HEALTH CARE EDUCATION/TRAINING PROGRAM

## 2025-03-24 PROCEDURE — 77063 BREAST TOMOSYNTHESIS BI: CPT | Performed by: STUDENT IN AN ORGANIZED HEALTH CARE EDUCATION/TRAINING PROGRAM

## 2025-04-01 ENCOUNTER — HOSPITAL ENCOUNTER (OUTPATIENT)
Dept: ULTRASOUND IMAGING | Age: 50
Discharge: HOME | End: 2025-04-01
Attending: STUDENT IN AN ORGANIZED HEALTH CARE EDUCATION/TRAINING PROGRAM
Payer: COMMERCIAL

## 2025-04-01 DIAGNOSIS — R59.1 LYMPHADENOPATHY OF HEAD AND NECK: ICD-10-CM

## 2025-04-01 PROCEDURE — 76536 US EXAM OF HEAD AND NECK: CPT | Performed by: STUDENT IN AN ORGANIZED HEALTH CARE EDUCATION/TRAINING PROGRAM

## 2025-04-05 ENCOUNTER — LAB ENCOUNTER (OUTPATIENT)
Dept: LAB | Age: 50
End: 2025-04-05
Attending: STUDENT IN AN ORGANIZED HEALTH CARE EDUCATION/TRAINING PROGRAM
Payer: COMMERCIAL

## 2025-04-05 DIAGNOSIS — Z00.00 PHYSICAL EXAM, ANNUAL: ICD-10-CM

## 2025-04-05 DIAGNOSIS — Z13.0 SCREENING FOR ENDOCRINE, METABOLIC AND IMMUNITY DISORDER: ICD-10-CM

## 2025-04-05 DIAGNOSIS — Z13.29 SCREENING FOR ENDOCRINE, METABOLIC AND IMMUNITY DISORDER: ICD-10-CM

## 2025-04-05 DIAGNOSIS — Z13.220 SCREENING FOR LIPID DISORDERS: ICD-10-CM

## 2025-04-05 DIAGNOSIS — Z13.228 SCREENING FOR ENDOCRINE, METABOLIC AND IMMUNITY DISORDER: ICD-10-CM

## 2025-04-05 DIAGNOSIS — Z13.0 SCREENING FOR DEFICIENCY ANEMIA: ICD-10-CM

## 2025-04-05 LAB
ALBUMIN SERPL-MCNC: 5.1 G/DL (ref 3.2–4.8)
ALBUMIN/GLOB SERPL: 1.6 {RATIO} (ref 1–2)
ALP LIVER SERPL-CCNC: 60 U/L
ALT SERPL-CCNC: 28 U/L
ANION GAP SERPL CALC-SCNC: 10 MMOL/L (ref 0–18)
AST SERPL-CCNC: 25 U/L (ref ?–34)
BASOPHILS # BLD AUTO: 0.08 X10(3) UL (ref 0–0.2)
BASOPHILS NFR BLD AUTO: 1 %
BILIRUB SERPL-MCNC: 0.6 MG/DL (ref 0.3–1.2)
BUN BLD-MCNC: 15 MG/DL (ref 9–23)
CALCIUM BLD-MCNC: 10.5 MG/DL (ref 8.7–10.6)
CHLORIDE SERPL-SCNC: 103 MMOL/L (ref 98–112)
CHOLEST SERPL-MCNC: 200 MG/DL (ref ?–200)
CO2 SERPL-SCNC: 28 MMOL/L (ref 21–32)
CREAT BLD-MCNC: 0.83 MG/DL
EGFRCR SERPLBLD CKD-EPI 2021: 86 ML/MIN/1.73M2 (ref 60–?)
EOSINOPHIL # BLD AUTO: 0.23 X10(3) UL (ref 0–0.7)
EOSINOPHIL NFR BLD AUTO: 2.8 %
ERYTHROCYTE [DISTWIDTH] IN BLOOD BY AUTOMATED COUNT: 13.4 %
EST. AVERAGE GLUCOSE BLD GHB EST-MCNC: 128 MG/DL (ref 68–126)
FASTING PATIENT LIPID ANSWER: YES
FASTING STATUS PATIENT QL REPORTED: YES
GLOBULIN PLAS-MCNC: 3.2 G/DL (ref 2–3.5)
GLUCOSE BLD-MCNC: 92 MG/DL (ref 70–99)
HBA1C MFR BLD: 6.1 % (ref ?–5.7)
HCT VFR BLD AUTO: 39.5 %
HDLC SERPL-MCNC: 60 MG/DL (ref 40–59)
HGB BLD-MCNC: 13 G/DL
IMM GRANULOCYTES # BLD AUTO: 0.01 X10(3) UL (ref 0–1)
IMM GRANULOCYTES NFR BLD: 0.1 %
LDLC SERPL CALC-MCNC: 111 MG/DL (ref ?–100)
LYMPHOCYTES # BLD AUTO: 4.15 X10(3) UL (ref 1–4)
LYMPHOCYTES NFR BLD AUTO: 50.5 %
MCH RBC QN AUTO: 29.4 PG (ref 26–34)
MCHC RBC AUTO-ENTMCNC: 32.9 G/DL (ref 31–37)
MCV RBC AUTO: 89.4 FL
MONOCYTES # BLD AUTO: 0.48 X10(3) UL (ref 0.1–1)
MONOCYTES NFR BLD AUTO: 5.8 %
NEUTROPHILS # BLD AUTO: 3.26 X10 (3) UL (ref 1.5–7.7)
NEUTROPHILS # BLD AUTO: 3.26 X10(3) UL (ref 1.5–7.7)
NEUTROPHILS NFR BLD AUTO: 39.8 %
NONHDLC SERPL-MCNC: 140 MG/DL (ref ?–130)
OSMOLALITY SERPL CALC.SUM OF ELEC: 292 MOSM/KG (ref 275–295)
PLATELET # BLD AUTO: 315 10(3)UL (ref 150–450)
POTASSIUM SERPL-SCNC: 4.7 MMOL/L (ref 3.5–5.1)
PROT SERPL-MCNC: 8.3 G/DL (ref 5.7–8.2)
RBC # BLD AUTO: 4.42 X10(6)UL
SODIUM SERPL-SCNC: 141 MMOL/L (ref 136–145)
TRIGL SERPL-MCNC: 170 MG/DL (ref 30–149)
TSI SER-ACNC: 3.13 UIU/ML (ref 0.55–4.78)
VLDLC SERPL CALC-MCNC: 29 MG/DL (ref 0–30)
WBC # BLD AUTO: 8.2 X10(3) UL (ref 4–11)

## 2025-04-05 PROCEDURE — 36415 COLL VENOUS BLD VENIPUNCTURE: CPT

## 2025-04-05 PROCEDURE — 83036 HEMOGLOBIN GLYCOSYLATED A1C: CPT

## 2025-04-05 PROCEDURE — 84443 ASSAY THYROID STIM HORMONE: CPT

## 2025-04-05 PROCEDURE — 80061 LIPID PANEL: CPT

## 2025-04-05 PROCEDURE — 80053 COMPREHEN METABOLIC PANEL: CPT

## 2025-04-05 PROCEDURE — 85025 COMPLETE CBC W/AUTO DIFF WBC: CPT

## 2025-04-10 ENCOUNTER — OFFICE VISIT (OUTPATIENT)
Facility: LOCATION | Age: 50
End: 2025-04-10

## 2025-04-10 DIAGNOSIS — K11.8 PAROTID MASS: Primary | ICD-10-CM

## 2025-04-10 DIAGNOSIS — R59.0 ANTERIOR CERVICAL LYMPHADENOPATHY: ICD-10-CM

## 2025-04-10 PROCEDURE — 99203 OFFICE O/P NEW LOW 30 MIN: CPT | Performed by: OTOLARYNGOLOGY

## 2025-04-10 NOTE — PROGRESS NOTES
NEW PATIENT PROGRESS NOTE  OTOLOGY/OTOLARYNGOLOGY    REF MD:  Anel Larsen MD  [address] [address]    PCP: Arabella Santoyo MD    CHIEF COMPLAINT:  No chief complaint on file.    HISTORY OF PRESENT ILLNESS: Jonathan Barba is a 49 year old female who presents for evaluation of lymphadenopathy for about 1 year. Patient was evaluated by her PCP, who noted enlargement of lymph nodes, a finding also documented in January 2024. Imaging at that time revealed 1 cm lymph nodes.  However, during follow-up, significant enlargement of the periauricular lymph nodes was observed. As a result, a neck ultrasound, ENT evaluation, and possible biopsy were recommended.  No night sweats, chills, or fevers. No other symptoms reported    PAST MEDICAL HISTORY:  Past Medical History[1]    PAST SURGICAL HISTORY:  Past Surgical History[2]    Medications Ordered Prior to Encounter[3]    Allergies: Allergies[4]    SOCIAL HISTORY:    Social History     Tobacco Use    Smoking status: Never     Passive exposure: Never    Smokeless tobacco: Never   Substance Use Topics    Alcohol use: Never       Family History[5]    REVIEW OF SYSTEMS:   PER HPI    EXAMINATION:  I washed my hands with an alcohol-based hand gel prior to examination  Constitutional:   --Vitals: There were no vitals taken for this visit.  General: no apparent distress, well-developed, conversant  Psych: affect pleasant and appropriate for age, alert and oriented  Neuro: Cranial nerves: EOMI, Facial sensation intact to touch, palate elevates midline, tongue protrudes midline, shoulder shrug intact bilateral, facial movement normal bilateral  Respiratory: No stridor, stertor or increased work of breathing  ENT:  --Nose: no external nasal deformity, anterior rhinoscopy: Septum midline, no inferior turbinate hypertrophy, mucosa healthy, no rhinorrhea  --OC/OP: No trismus. No masses or lesions noted over the gingiva, buccal mucosa, tongue, FOM,  hard/soft palate, tonsillar pillars, posterior pharyngeal wall. Tonsils are symmetric and soft. FOM/BOT are soft.   --Neck: Sub-centimeter lymphadenopathy posterior to the left angle of the mandible, mobile,  level 2B. no thyromegaly. 1.5 cm round, slightly firm mass over the right angle of the mandible overlying the parotid gland. no masses or lesions over the left submandibular or parotid glands  --Ear: (bilateral ears were examined under binocular microscopy)  Right ear microscopic exam:  Pinna: Normal, no lesions or masses.  Mastoid: Nontender on palpation.   External auditory canal: Clear, no masses or lesions.  Tympanic membrane: Intact, no lesions, normal landmarks.  Middle ear: Aerated.    Left ear microscopic exam:  Pinna: Normal, no lesions or masses.  Mastoid: Nontender on palpation.   External auditory canal: Clear, no masses or lesions.  Tympanic membrane: Intact, no lesions, normal landmarks.  Middle ear: Aerated.    US Head/Neck - 1/17/2024  Impression   CONCLUSION:  Similar hypoechoic nodules are seen at the site of symptoms as guided by the patient.  They likely reflect small lymph nodes.  Continued clinical correlation recommended.       US Head/Neck -  4/01/2025  Impression   CONCLUSION:    1. Compared to the exam of 1/17/2024, there is increase in the size of the abnormal lymph node anterior to the right ear presently measures 1.5 x 1.3 cm, previously 1.2 x 0.8 cm.  The differential would include reactive lymphadenopathy, primary versus  secondary neoplasm.  If further imaging is required CT of the neck with contrast would be recommended.  Alternatively tissue sampling could be performed.  2. There is a normal appearing 0.5 x 0.4 cm lymph node in the left submandibular triangle.  This is smaller, previously measured 0.9 x 0.6 cm.  3. There is a stable abnormal appearing complex lymph node/mass within the left submandibular region measuring 1.1 x 0.7 cm.  This previously measured 1.2 x 0.7 cm and  is unchanged.       ASSESSMENT/PLAN:  Jonathan Barba is a 49 year old female with   No diagnosis found.     IMPRESSION:  Possible right parotid mass  Anterior cervical sub centimeter lymphadenopathy    PLAN:  -Recommend CT  Soft Tissue of Neck with IV contrast to better characterize right mass and anterior lymphadenopathy  -Discussed possible fine needle aspiration biopsy of parotid gland mass pending CT results. Also discussed benign vs malignant possibilities, with an estimated 80% likelihood of it being benign  -Will follow up with patient with imaging results and next steps    Situation reviewed with the patient in detail.    Attention: This note has been scribed by Andra Nixon under the supervision of Monico Leary MD.     Monico Leary MD  Otology/Otolaryngology  [address] [place] [address]  Phone [phone]  Fax [phone]      I have personally performed the services described in this documentation. All medical record entries made by the scribe were at my direction and in my presence. I have reviewed the chart and agree that the medical record reflects my personal performance and is accurate and complete.             [1]   Past Medical History:   Abnormal uterine bleeding    Amenorrhea    Back pain    Comes and goes    Dysmenorrhea    Endometriosis    Esophageal reflux    On and off    Fibroids    Food intolerance    Mild Lactose intolerance    Gastric Erosion W/O hem    Gastritis    Gastroesophageal reflux disease    Heartburn    High cholesterol    Hyperlipidemia    Menses painful    Comes and goes    Myoma    uterine    Night sweats    Maybe due to menopause    Right upper quadrant pain    Scoliosis    Wears glasses    For computer use or near reading only    Weight gain   [2] No past surgical history on file.  [3]   Current Outpatient Medications on File Prior to Visit   Medication Sig Dispense Refill     omeprazole 20 MG Oral Capsule Delayed Release TAKE 1 CAPSULE DAILY 30 MINUTES BEFORE BREAKFAST 30 capsule 0    FENOFIBRATE 145 MG Oral Tab TAKE 1 TABLET BY MOUTH EVERY DAY 30 tablet 0    Calcium Carbonate-Vitamin D (CALCIUM + D OR) Take by mouth daily.       No current facility-administered medications on file prior to visit.   [4]   Allergies  Allergen Reactions    Oysters RASH   [5]   Family History  Problem Relation Age of Onset    Heart Disorder Mother     Diabetes Father             No Known Problems Sister     No Known Problems Brother     Other (pneumonia) Brother     Breast Cancer Maternal Grandmother 80

## 2025-04-14 ENCOUNTER — HOSPITAL ENCOUNTER (OUTPATIENT)
Dept: ULTRASOUND IMAGING | Age: 50
Discharge: HOME | End: 2025-04-14
Attending: STUDENT IN AN ORGANIZED HEALTH CARE EDUCATION/TRAINING PROGRAM
Payer: COMMERCIAL

## 2025-04-14 DIAGNOSIS — R10.11 RUQ PAIN: ICD-10-CM

## 2025-04-14 PROCEDURE — 76705 ECHO EXAM OF ABDOMEN: CPT | Performed by: STUDENT IN AN ORGANIZED HEALTH CARE EDUCATION/TRAINING PROGRAM

## 2025-04-17 DIAGNOSIS — E78.5 HYPERLIPIDEMIA, UNSPECIFIED HYPERLIPIDEMIA TYPE: ICD-10-CM

## 2025-04-17 RX ORDER — FENOFIBRATE 145 MG/1
145 TABLET, FILM COATED ORAL DAILY
Qty: 90 TABLET | Refills: 1 | Status: SHIPPED | OUTPATIENT
Start: 2025-04-17

## 2025-04-17 NOTE — TELEPHONE ENCOUNTER
Cholesterol Medication Protocol Passed04/17/2025 12:08 AM   Protocol Details ALT < 80    ALT resulted within past year    Lipid panel within past 12 months    In person appointment or virtual visit in the past 12 mos or appointment in next 3 mos    Medication is active on med list

## 2025-04-20 ENCOUNTER — HOSPITAL ENCOUNTER (OUTPATIENT)
Dept: CT IMAGING | Age: 50
Discharge: HOME | End: 2025-04-20
Attending: OTOLARYNGOLOGY
Payer: COMMERCIAL

## 2025-04-20 ENCOUNTER — HOSPITAL ENCOUNTER (OUTPATIENT)
Dept: CT IMAGING | Age: 50
End: 2025-04-20
Attending: OTOLARYNGOLOGY
Payer: COMMERCIAL

## 2025-04-20 DIAGNOSIS — K11.8 PAROTID MASS: ICD-10-CM

## 2025-04-20 DIAGNOSIS — R59.0 ANTERIOR CERVICAL LYMPHADENOPATHY: ICD-10-CM

## 2025-04-20 PROCEDURE — 70491 CT SOFT TISSUE NECK W/DYE: CPT | Performed by: OTOLARYNGOLOGY

## 2025-04-23 ENCOUNTER — PATIENT MESSAGE (OUTPATIENT)
Facility: LOCATION | Age: 50
End: 2025-04-23

## 2025-04-23 DIAGNOSIS — K11.8 PAROTID MASS: Primary | ICD-10-CM

## 2025-04-25 ENCOUNTER — ORDER TRANSCRIPTION (OUTPATIENT)
Dept: ADMINISTRATIVE | Facility: HOSPITAL | Age: 50
End: 2025-04-25

## 2025-04-25 DIAGNOSIS — K11.8 PAROTID MASS: Primary | ICD-10-CM

## 2025-05-13 NOTE — DISCHARGE INSTRUCTIONS
Discharge/After Visit Instructions  [facility] - Department of Radiology  Biopsy of the Thyroid - Biopsy of Lymph Node - Biopsy of Soft Tissue    Activity  Take it easy for the rest of the day after your biopsy. You may be sore at the site of the biopsy for the next 5 days. Do not do any strenuous exercises or lift over 5 lbs. for 24 hours after the procedure.     Biopsy Site  Keep the bandage on the biopsy site for the next hour. No shower/bathing restrictions.    Pain Management  You may use over-the-counter or prescribed pain relief medication if not contraindicated due to a medical condition.   You may use a covered ice pack to the procedure site for 20 min, as needed, for pain.   The site may be red or tender for a few days.  You may develop a sore throat after the procedure. If necessary, throat lozenges may be used to relieve the discomfort.     Medications  You may resume your usual home medications, including blood thinners (24 hours after the procedure) if you experience no bleeding or hematoma at the puncture site.     When to seek medical advice  Call the provider that ordered the biopsy with questions and to discuss test results. They may also be available on your [facility] My Chart. You may also call the Radiology nurse at [phone], M-F, 8-5 with any additional questions or concerns.    Dial [phone] and ask the  to page the on-call Radiologist right away if any of these occur:  There is a change in color or temperature of the area where the biopsy was performed.  You develop increasing pain, increased swelling in your neck, difficulty breathing or swallowing.    IF YOU FEEL YOU ARE HAVING AN EMERGENCY,   CALL 911 OR GO TO THE NEAREST EMERGENCY ROOM      4.2.24 MO/  Radiology

## 2025-05-15 ENCOUNTER — HOSPITAL ENCOUNTER (OUTPATIENT)
Dept: ULTRASOUND IMAGING | Facility: HOSPITAL | Age: 50
Discharge: HOME | End: 2025-05-15
Attending: OTOLARYNGOLOGY
Payer: COMMERCIAL

## 2025-05-15 ENCOUNTER — APPOINTMENT (OUTPATIENT)
Dept: LAB | Facility: HOSPITAL | Age: 50
End: 2025-05-15
Attending: OTOLARYNGOLOGY
Payer: COMMERCIAL

## 2025-05-15 DIAGNOSIS — K11.8 PAROTID MASS: ICD-10-CM

## 2025-05-15 PROCEDURE — 10005 FNA BX W/US GDN 1ST LES: CPT | Performed by: OTOLARYNGOLOGY

## 2025-05-15 PROCEDURE — 88173 CYTOPATH EVAL FNA REPORT: CPT | Performed by: OTOLARYNGOLOGY

## 2025-05-15 PROCEDURE — 88172 CYTP DX EVAL FNA 1ST EA SITE: CPT | Performed by: OTOLARYNGOLOGY

## 2025-05-15 PROCEDURE — 88177 CYTP FNA EVAL EA ADDL: CPT | Performed by: OTOLARYNGOLOGY

## 2025-05-15 PROCEDURE — 88305 TISSUE EXAM BY PATHOLOGIST: CPT | Performed by: OTOLARYNGOLOGY

## 2025-06-05 ENCOUNTER — OFFICE VISIT (OUTPATIENT)
Facility: LOCATION | Age: 50
End: 2025-06-05

## 2025-06-05 VITALS — WEIGHT: 152 LBS | HEIGHT: 59 IN | BODY MASS INDEX: 30.64 KG/M2

## 2025-06-05 DIAGNOSIS — D11.0 PLEOMORPHIC ADENOMA OF PAROTID GLAND: Primary | ICD-10-CM

## 2025-06-05 PROCEDURE — 99214 OFFICE O/P EST MOD 30 MIN: CPT | Performed by: OTOLARYNGOLOGY

## 2025-06-05 NOTE — PROGRESS NOTES
PROGRESS NOTE  OTOLOGY/OTOLARYNGOLOGY    REF MD:  Anel Larsen MD  [address] [address]    PCP: Arabella Santoyo MD    CHIEF COMPLAINT:    Chief Complaint   Patient presents with    Follow - Up     Patient here for parotid mass f/up with ultrasound FNA results.      Interval hx: Patient returns for review of pathology results.      HISTORY OF PRESENT ILLNESS: Jonathan Barba is a 49 year old female who presents for evaluation of lymphadenopathy for about 1 year. Patient was evaluated by her PCP, who noted enlargement of lymph nodes, a finding also documented in January 2024. Imaging at that time revealed 1 cm lymph nodes.  However, during follow-up, significant enlargement of the periauricular lymph nodes was observed. As a result, a neck ultrasound, ENT evaluation, and possible biopsy were recommended.  No night sweats, chills, or fevers. No other symptoms reported    PAST MEDICAL HISTORY:  Past Medical History[1]    PAST SURGICAL HISTORY:  Past Surgical History[2]    Medications Ordered Prior to Encounter[3]    Allergies: Allergies[4]    SOCIAL HISTORY:    Social History     Tobacco Use    Smoking status: Never     Passive exposure: Never    Smokeless tobacco: Never   Substance Use Topics    Alcohol use: Never       Family History[5]    REVIEW OF SYSTEMS:   PER HPI    EXAMINATION:  I washed my hands with an alcohol-based hand gel prior to examination  Constitutional:   --Vitals: Height 4' 11\" (1.499 m), weight 152 lb (68.9 kg).  General: no apparent distress, well-developed, conversant  Neuro: facial movement normal bilateral  Respiratory: No stridor, stertor or increased work of breathing  ENT:  --Nose: no external nasal deformity, anterior rhinoscopy: Septum midline, no inferior turbinate hypertrophy, mucosa healthy, no rhinorrhea  --OC/OP: No trismus. No masses or lesions noted over the gingiva, buccal mucosa, tongue, FOM, hard/soft palate, tonsillar pillars, posterior  pharyngeal wall. Tonsils are symmetric and soft. FOM/BOT are soft.   --Neck: Sub-centimeter lymphadenopathy posterior to the left angle of the mandible, mobile,  level 2B. no thyromegaly. 1.5 cm round, slightly firm mass over the right angle of the mandible overlying the parotid gland. no masses or lesions over the left submandibular or parotid glands      US Head/Neck - 1/17/2024  Impression   CONCLUSION:  Similar hypoechoic nodules are seen at the site of symptoms as guided by the patient.  They likely reflect small lymph nodes.  Continued clinical correlation recommended.       US Head/Neck -  4/01/2025  Impression   CONCLUSION:    1. Compared to the exam of 1/17/2024, there is increase in the size of the abnormal lymph node anterior to the right ear presently measures 1.5 x 1.3 cm, previously 1.2 x 0.8 cm.  The differential would include reactive lymphadenopathy, primary versus  secondary neoplasm.  If further imaging is required CT of the neck with contrast would be recommended.  Alternatively tissue sampling could be performed.  2. There is a normal appearing 0.5 x 0.4 cm lymph node in the left submandibular triangle.  This is smaller, previously measured 0.9 x 0.6 cm.  3. There is a stable abnormal appearing complex lymph node/mass within the left submandibular region measuring 1.1 x 0.7 cm.  This previously measured 1.2 x 0.7 cm and is unchanged.       ASSESSMENT/PLAN:  Jonathan Barba is a 49 year old female with     ICD-10-CM   1. Pleomorphic adenoma of parotid gland  D11.0      Assessment & Plan  Pleomorphic adenoma of parotid gland, right  Pleomorphic adenoma confirmed by pathology. Benign but has a malignant transformation. The risk overall is low, but does increase with time. The growth is located in superficial lobe. Discussed surgical risks and emphasized careful technique and nerve monitoring. She prefers surgery due to malignancy concerns.  - Recommend right superficial parotidectomy  with facial nerve monitoring. Discussed I would perform this procedure with my partner Dr. Daugherty. Alternative would be observation.   - Risks of surgery include bleeding, infection, facial nerve injury causing temporary or permanent facial weakness, Alia's syndrome (gustatory sweating), first bite syndrome (more common when growth is in the deep lobe of the parotid gland), need for drain after surgery, scarring, recurrence of pleomorphic adenoma, and risk of anesthesia.  - Obtain medical clearance from primary care provider before surgery.    1cm lymph node - appears benign, reactive, can observe    Situation reviewed with the patient in detail.    Monico Leary MD  Otology/Otolaryngology  [address] [place] [id number]  Phone [phone]  Fax [phone]      Surgery scheduling instructions    Surgery: right superficial parotidectomy CPT 88154    Duration: 3 hours (can be adjusted based on Dr. Daugherty's preference)    Diagnosis:     ICD-10-CM   1. Pleomorphic adenoma of parotid gland  D11.0        Special equipment: facial nerve monitoring     Anesthesia: general    Admission: no    Place of surgery: [facility] OR    Pre operative work up needed: medical clearance         [1]   Past Medical History:   Abnormal uterine bleeding    Amenorrhea    Back pain    Comes and goes    Dysmenorrhea    Endometriosis    Esophageal reflux    On and off    Fibroids    Food intolerance    Mild Lactose intolerance    Gastric Erosion W/O hem    Gastritis    Gastroesophageal reflux disease    Heartburn    High cholesterol    Hyperlipidemia    Menses painful    Comes and goes    Myoma    uterine    Night sweats    Maybe due to menopause    Right upper quadrant pain    Scoliosis    Wears glasses    For computer use or near reading only    Weight gain   [2] History reviewed. No pertinent surgical history.  [3]   Current Outpatient Medications on File Prior to Visit    Medication Sig Dispense Refill    fenofibrate 145 MG Oral Tab TAKE 1 TABLET BY MOUTH EVERY DAY 90 tablet 1    omeprazole 20 MG Oral Capsule Delayed Release TAKE 1 CAPSULE DAILY 30 MINUTES BEFORE BREAKFAST 30 capsule 0    Calcium Carbonate-Vitamin D (CALCIUM + D OR) Take by mouth daily.       No current facility-administered medications on file prior to visit.   [4]   Allergies  Allergen Reactions    Oysters RASH   [5]   Family History  Problem Relation Age of Onset    Heart Disorder Mother     Diabetes Father             No Known Problems Sister     No Known Problems Brother     Other (pneumonia) Brother     Breast Cancer Maternal Grandmother 80

## 2025-06-05 NOTE — PROGRESS NOTES
The following individual(s) verbally consented to be recorded using ambient AI listening technology and understand that they can each withdraw their consent to this listening technology at any point by asking the clinician to turn off or pause the recording:  Yes to consent  Patient name: Jonathan Barba  Additional names:  Geraldine (mother in law)  () Bella

## 2025-06-06 ENCOUNTER — TELEPHONE (OUTPATIENT)
Dept: OTOLARYNGOLOGY | Facility: CLINIC | Age: 50
End: 2025-06-06

## 2025-06-06 DIAGNOSIS — D11.0 PLEOMORPHIC ADENOMA OF PAROTID GLAND: Primary | ICD-10-CM

## 2025-06-06 NOTE — TELEPHONE ENCOUNTER
Patient scheduled for Right superficial parotidectomy with facial nerve monitoring on 7/14/25 at [facility]. Patient aware to obtain medical clearance prior to surgery, medical clearance request faxed to PCP.

## 2025-06-06 NOTE — PROGRESS NOTES
Patient scheduled for Right superficial parotidectomy with facial nerve monitoring on 7/14/25 at [facility]

## 2025-06-09 ENCOUNTER — TELEPHONE (OUTPATIENT)
Dept: INTERNAL MEDICINE CLINIC | Facility: CLINIC | Age: 50
End: 2025-06-09

## 2025-06-09 NOTE — TELEPHONE ENCOUNTER
Date of pre-op appt:  6/30/25  Provider pre-op scheduled with: Dr Larsen   Surgeon's name:  Dr Leary   Phone #:  [phone]   Fax #:  [phone]  Surgery date:  7/14/25  Procedure/diagnosis:  right superficial parotidectomy  Does patient routinely see Cardiology or Pulmonology?   If yes, please inform Pt that they may need clearance from them also    Pre op form given to: Nilda   Pre op faxed to Dr Leary

## 2025-06-30 ENCOUNTER — PATIENT MESSAGE (OUTPATIENT)
Facility: LOCATION | Age: 50
End: 2025-06-30

## 2025-07-03 ENCOUNTER — OFFICE VISIT (OUTPATIENT)
Facility: LOCATION | Age: 50
End: 2025-07-03
Payer: COMMERCIAL

## 2025-07-03 VITALS — WEIGHT: 152 LBS | HEIGHT: 59 IN | BODY MASS INDEX: 30.64 KG/M2

## 2025-07-03 DIAGNOSIS — D11.0 PLEOMORPHIC ADENOMA OF PAROTID GLAND: Primary | ICD-10-CM

## 2025-07-03 DIAGNOSIS — K11.8 PAROTID MASS: ICD-10-CM

## 2025-07-03 PROCEDURE — 99213 OFFICE O/P EST LOW 20 MIN: CPT | Performed by: OTOLARYNGOLOGY

## 2025-07-07 ENCOUNTER — OFFICE VISIT (OUTPATIENT)
Dept: INTERNAL MEDICINE CLINIC | Facility: CLINIC | Age: 50
End: 2025-07-07
Payer: COMMERCIAL

## 2025-07-07 VITALS
TEMPERATURE: 98 F | SYSTOLIC BLOOD PRESSURE: 128 MMHG | OXYGEN SATURATION: 99 % | BODY MASS INDEX: 31.85 KG/M2 | WEIGHT: 158 LBS | DIASTOLIC BLOOD PRESSURE: 76 MMHG | RESPIRATION RATE: 20 BRPM | HEART RATE: 80 BPM | HEIGHT: 59 IN

## 2025-07-07 DIAGNOSIS — E66.811 CLASS 1 OBESITY DUE TO EXCESS CALORIES WITHOUT SERIOUS COMORBIDITY WITH BODY MASS INDEX (BMI) OF 31.0 TO 31.9 IN ADULT: ICD-10-CM

## 2025-07-07 DIAGNOSIS — Z01.818 PREOP EXAM FOR INTERNAL MEDICINE: Primary | ICD-10-CM

## 2025-07-07 DIAGNOSIS — E66.09 CLASS 1 OBESITY DUE TO EXCESS CALORIES WITHOUT SERIOUS COMORBIDITY WITH BODY MASS INDEX (BMI) OF 31.0 TO 31.9 IN ADULT: ICD-10-CM

## 2025-07-07 DIAGNOSIS — D11.0 PLEOMORPHIC ADENOMA OF PAROTID GLAND: ICD-10-CM

## 2025-07-07 DIAGNOSIS — K21.00 GASTROESOPHAGEAL REFLUX DISEASE WITH ESOPHAGITIS WITHOUT HEMORRHAGE: ICD-10-CM

## 2025-07-07 NOTE — PROGRESS NOTES
PRE-OPERATIVE EVALUATION OFFICE NOTE  Jonathan Barba presents for preoperative evaluation for: Pleomorphic adenoma of parotid gland  Performing Physician: Dr. Leary  Date of surgery: 7/14/2025  Elective or emergency: Elective  Pre-operative forms provided: Yes  Anesthesia: General    Patient is at acceptable risk to proceed with surgery at this time without any further testing. Patient is completely asymptomatic at this time. No labs or EKG indicated.     Hx of:   Problem List[1]    No other concerns.     Assessment & Plan  1. Preop exam for internal medicine (Primary)  2. Pleomorphic adenoma of parotid gland  3. Gastroesophageal reflux disease with esophagitis without hemorrhage  4. Class 1 obesity due to excess calories without serious comorbidity with body mass index (BMI) of 31.0 to 31.9 in adult    //Pleomorphic adenoma of R parotid gland  Recent bout of parotiditis treated with amoxicillin. Much improved. Planning for partial removal with ENT. Preop done for this reason. NO fevers, chills or pain with palpation.     //Elevated BP reading  Instructed to monitor closely at home. Reevaluate in 3 months. Discussed lifestyle changes like exercise and dietary modifications.     //GERD with gastritis without hemorrhage  Not taking omperazole, only when having symptoms. Main symptom is nausea.   PLAN:   -Omeprazole 20mg daily PRN     //Borborygmi  PLAN:   Bean-o over the counter has helped dyspepsia symptoms. Still has borborygmi on empty stomach.   Abdominal ultrasound ordered to evaluate gallbladder pathology. Has not been done yet.     //Chronic constipation  //Internal Hemorrhoids  Denies blood in her stool.  PLAN:   CTM.     //Hypertriglyceridemia  //HLD   PLAN:   -Fenofibrate 145mg daily. Tolerating medication well.     //Post-menopausal  No periods for last year.   PLAN:   -Continue calcium and vitamin D supplement.     //Chronic back pain   //Hx of Scoliosis   Has seen chiropracter, but no  difference.  PLAN:   -PT referral     //Hx of uterine firboids    Jonathan Barba is a pleasant 49 year old year old who presents for pre-operative evaluation for above procedure.   Jonathan Barba has greater than 4 mets of activity.   Jonathan Barba has no active cardiac complaints, cardiac history noted as above.   Jonathan Barba has no active pulmonary complaints, pulmonary history noted as above.  Labs and imaging ordered as per pre-op recommendations by surgeon or per up-to-date; please see chart for results.     Patient is at acceptable risk to proceed with surgery at this time without any further testing. Patient is completely asymptomatic at this time.      Follow Up: Return in about 8 weeks (around 9/1/2025) for as scheduled in september for BP check ..     Anel Larsen MD  Internal Medicine      Cervical/neck:   - Arthritis: No  - Neck pain: No  - Difficulty with ROM: No   - Prior neck injury/surgery: No     NSAIDS/PLATELET INH: Denies   DIABETIC MEDICATIONS: denies   AUGUSTIN: denies   Hx of VTE: denies   BLEEDING DISORDER: denies   LOOSE DENTITION OR DENTAL APPLIANCES: denies   URI, COUGH, CP, FEVER: denies     Cardiac:  - History of ischemic coronary disease: denies   - History of heart failure: denies   - Heart attack in past 90 days: denies   - Chest pain in last 90 days: denies   - History of Arrhythmia: denies   - History of stroke or TIA:denies   - Diabetes/A1C: Last A1c value was 6.1% done 4/5/2025.      - Pre-op Creatinine > 2: no   - Anticoagulation/Warfarin/ASA/NOAC: denies   - Echo/ Stress test if available: none     Pulmonary:   - Smoker: denies   - Apnea/CPAP: denies   - Asthma/COPD: denies   - Difficulty laying flat for extended period of time: denies   - Dyspnea/exertional: denies   - Respiratory Medications: denies     Functional Capacity:   Perform ADLs- eating, dress, toilet (1 METs)? Yes, patient can perform.   Walk up flight of steps,  hill, walk ground level 3-4mph (4 METs)? Yes, patient can perform.   Perform heavy housework- scrubbing floors, move heavy furniture, climb 2 flights of stairs (4-10 METs)? yes   Participate in strenuous sports- swimming, singles tennis, football, basketball, ski (+10 METs)? yes     Vitals:    07/07/25 1333   BP: 128/76   Pulse: 80   Resp: 20   Temp: 97.8 °F (36.6 °C)   TempSrc: Temporal   SpO2: 99%   Weight: 158 lb (71.7 kg)   Height: 4' 11\" (1.499 m)     Body mass index is 31.91 kg/m².  BP Readings from Last 3 Encounters:   07/07/25 128/76   03/17/25 136/70   01/08/24 136/86     The 10-year ASCVD risk score (Deb RIOS, et al., 2019) is: 1.1%    Values used to calculate the score:      Age: 49 years      Sex: Female      Is Non- : No      Diabetic: No      Tobacco smoker: No      Systolic Blood Pressure: 128 mmHg      Is BP treated: No      HDL Cholesterol: 60 mg/dL      Total Cholesterol: 200 mg/dL  Objective  Blood pressure 128/76, pulse 80, temperature 97.8 °F (36.6 °C), temperature source Temporal, resp. rate 20, height 4' 11\" (1.499 m), weight 158 lb (71.7 kg), SpO2 99%.  General: No acute distress. Alert and oriented x 3.  HEENT: Ear canals clear. TMs pearly gray bilaterally.  Respiratory: Clear to auscultation bilaterally.  No wheezes, rales, or rhonchi.   Cardiovascular: RRR. No murmurs, rubs, or gallops.   Abdomen: Soft, nontender, nondistended.  Normoactive bowel sounds. No rebound tenderness  Neurologic: PERRLA. EOM intact. No focal neurological deficits.  Musculoskeletal: Full range of motion of all extremities.  No swelling noted.  Psychiatric: Appropriate mood and affect.    Reviewed Current Medications:  Current Medications[2]    LABS:  Lab Results   Component Value Date    WBC 8.2 04/05/2025    RBC 4.42 04/05/2025    HGB 13.0 04/05/2025    HCT 39.5 04/05/2025    MCV 89.4 04/05/2025    MCH 29.4 04/05/2025    MCHC 32.9 04/05/2025    RDW 13.4 04/05/2025    .0 04/05/2025       Lab Results   Component Value Date    GLU 92 04/05/2025    BUN 15 04/05/2025    BUNCREA 23.1 (H) 07/10/2021    CREATSERUM 0.83 04/05/2025    ANIONGAP 10 04/05/2025    GFRNAA 106 10/16/2021    GFRAA 122 10/16/2021    CA 10.5 04/05/2025    OSMOCALC 292 04/05/2025    ALKPHO 60 04/05/2025    AST 25 04/05/2025    ALT 28 04/05/2025    BILT 0.6 04/05/2025    TP 8.3 (H) 04/05/2025    ALB 5.1 (H) 04/05/2025    GLOBULIN 3.2 04/05/2025     04/05/2025    K 4.7 04/05/2025     04/05/2025    CO2 28.0 04/05/2025      Lab Results   Component Value Date    CHOLEST 200 (H) 04/05/2025    TRIG 170 (H) 04/05/2025    HDL 60 (H) 04/05/2025     (H) 04/05/2025    VLDL 29 04/05/2025    TCHDLRATIO 4.10 01/25/2018    NONHDLC 140 (H) 04/05/2025      Lab Results   Component Value Date    TSH 3.133 04/05/2025      Lab Results   Component Value Date     (H) 04/05/2025    A1C 6.1 (H) 04/05/2025        Reviewed:  Patient Active Problem List    Diagnosis    Class 1 obesity due to excess calories without serious comorbidity with body mass index (BMI) of 31.0 to 31.9 in adult    Pleomorphic adenoma of parotid gland    Dyspareunia in female    Chronic right-sided low back pain with right-sided sciatica    GERD (gastroesophageal reflux disease)    Special screening for malignant neoplasm of colon    Internal hemorrhoids    Wrist pain, chronic    Scoliosis      Allergies[3]     Short Social Hx on File[4]   Review of Systems  All other systems negative unless otherwise stated in ROS or HPI above.   US FNA PAROTID RIGHT (CPT=10005)  Narrative: PROCEDURE:  US BIOPSY SALIVA/PAROTID RIGHT (CPT=76942/48994)     COMPARISON:  None.     INDICATIONS:  K11.8 Parotid mass     DESCRIPTION:  Witnessed verbal and written informed consent was obtained. Time out was performed by the staff. An ultrasound-guided biopsy was performed in the usual sterile manner.     PATIENT STATED HISTORY: (As transcribed by Technologist)           Informed  consent was obtained.  The risks of the procedure, including but not limited to bleeding and infection and nondiagnostic sampling were discussed with the patient who elected to proceed. A time out procedure was performed.  Preliminary   ultrasonography was performed.  A suitable skin entry site was marked.  The skin was then sterilely cleansed and draped in standard fashion.   Maximum sterile barrier technique was used for the procedure by all operators.  Limited ultrasonography again   demonstrated the parotid lesion on the right.  The preliminary scan is otherwise unremarkable.  After the administration of local anesthesia, a 25g needle was advanced into the right parotid mass.  A total of 4 FNAs were obtained using a 25g needle, as   well as a single fine-needle aspiration using a 21 gauge needle.  US guidance confirmed successful placement of a biopsy needle.       The patient tolerated the procedure well.  There were no immediate procedure-related complications.  Final pathology is pending.                   Impression: CONCLUSION:  Uneventful ultrasound guided biopsy of the right parotid lesion.  The patient was instructed to obtain follow up care and biopsy results from the referring physician.        LOCATION:  [facility]              Dictated by (CST): Mason Wadsworth MD on 5/15/2025 at 10:26 AM       Finalized by (CST): Mason Wadsworth MD on 5/15/2025 at 10:27 AM         Call office with any questions or seek emergency care if necessary.   Patient understands and agrees to follow directions and advice.    ----------------------------------------- PATIENT INSTRUCTIONS-----------------------------------------     There are no Patient Instructions on file for this visit.             [1]   Patient Active Problem List  Diagnosis    Wrist pain, chronic    Scoliosis    GERD (gastroesophageal reflux disease)    Special screening for malignant neoplasm of colon    Internal hemorrhoids    Dyspareunia in female    Chronic  right-sided low back pain with right-sided sciatica    Class 1 obesity due to excess calories without serious comorbidity with body mass index (BMI) of 31.0 to 31.9 in adult    Pleomorphic adenoma of parotid gland   [2]   Current Outpatient Medications   Medication Sig Dispense Refill    amoxicillin clavulanate 875-125 MG Oral Tab Take 1 tablet by mouth 2 (two) times daily. 14 tablet 0    fenofibrate 145 MG Oral Tab TAKE 1 TABLET BY MOUTH EVERY DAY 90 tablet 1    omeprazole 20 MG Oral Capsule Delayed Release TAKE 1 CAPSULE DAILY 30 MINUTES BEFORE BREAKFAST 30 capsule 0    Calcium Carbonate-Vitamin D (CALCIUM + D OR) Take by mouth daily.     [3]   Allergies  Allergen Reactions    Oysters RASH   [4]   Social History  Socioeconomic History    Marital status:    Tobacco Use    Smoking status: Never     Passive exposure: Never    Smokeless tobacco: Never   Vaping Use    Vaping status: Never Used   Substance and Sexual Activity    Alcohol use: Never    Drug use: Never    Sexual activity: Never   Other Topics Concern    Caffeine Concern Yes     Comment: 1 cup of tea twice a week    Stress Concern No    Weight Concern Yes     Comment: Currently doing light exercises    Special Diet No    Exercise Yes     Comment: 5x a week walking    Seat Belt Yes     Social Drivers of Health     Food Insecurity: No Food Insecurity (3/17/2025)    NCSS - Food Insecurity     Worried About Running Out of Food in the Last Year: No     Ran Out of Food in the Last Year: No   Transportation Needs: No Transportation Needs (3/17/2025)    NCSS - Transportation     Lack of Transportation: No   Housing Stability: Not At Risk (3/17/2025)    NCSS - Housing/Utilities     Has Housing: Yes     Worried About Losing Housing: No     Unable to Get Utilities: No

## 2025-07-11 ENCOUNTER — TELEPHONE (OUTPATIENT)
Dept: OTOLARYNGOLOGY | Facility: CLINIC | Age: 50
End: 2025-07-11

## 2025-07-11 DIAGNOSIS — K11.8 PAROTID MASS: Primary | ICD-10-CM

## 2025-07-11 NOTE — TELEPHONE ENCOUNTER
Rescheduled case to 8/6/25.     Patient states she completed her antibiotics and is asking if she should be taking anything else prior to surgery? Informed patient that a nurse will call back,

## 2025-07-16 ENCOUNTER — PATIENT MESSAGE (OUTPATIENT)
Facility: LOCATION | Age: 50
End: 2025-07-16

## 2025-07-20 NOTE — PROGRESS NOTES
PROGRESS NOTE  OTOLOGY/OTOLARYNGOLOGY    REF MD:  Anel Larsen MD  [address] [address]    PCP: Arabella Santoyo MD    CHIEF COMPLAINT:    Chief Complaint   Patient presents with    Swelling     Right lower cheek swelling      Interval hx: Patient returns due to facial pain and swelling on the right..      HISTORY OF PRESENT ILLNESS: Jonathan Barba is a 49 year old female who presents for evaluation of lymphadenopathy for about 1 year. Patient was evaluated by her PCP, who noted enlargement of lymph nodes, a finding also documented in January 2024. Imaging at that time revealed 1 cm lymph nodes.  However, during follow-up, significant enlargement of the periauricular lymph nodes was observed. As a result, a neck ultrasound, ENT evaluation, and possible biopsy were recommended.  No night sweats, chills, or fevers. No other symptoms reported    PAST MEDICAL HISTORY:  Past Medical History[1]    PAST SURGICAL HISTORY:  Past Surgical History[2]    Medications Ordered Prior to Encounter[3]    Allergies: Allergies[4]    SOCIAL HISTORY:    Social History     Tobacco Use    Smoking status: Never     Passive exposure: Never    Smokeless tobacco: Never   Substance Use Topics    Alcohol use: Never       Family History[5]    REVIEW OF SYSTEMS:   PER HPI    EXAMINATION:  I washed my hands with an alcohol-based hand gel prior to examination  Constitutional:   --Vitals: Height 4' 11\" (1.499 m), weight 152 lb (68.9 kg).  General: no apparent distress, well-developed, conversant  Neuro: facial movement normal bilateral  Respiratory: No stridor, stertor or increased work of breathing  ENT:  --Nose: no external nasal deformity, anterior rhinoscopy: Septum midline, no inferior turbinate hypertrophy, mucosa healthy, no rhinorrhea  --OC/OP: No trismus. No masses or lesions noted over the gingiva, buccal mucosa, tongue, FOM, hard/soft palate, tonsillar pillars, posterior pharyngeal wall. Tonsils  are symmetric and soft. FOM/BOT are soft.   --Neck: Sub-centimeter lymphadenopathy posterior to the left angle of the mandible, mobile,  level 2B. no thyromegaly. 1.5 cm round, slightly firm mass over the right angle of the mandible overlying the parotid gland. no masses or lesions over the left submandibular or parotid glands. Right parotid gland is tender and overlying skin is erythematous.      US Head/Neck - 1/17/2024  Impression   CONCLUSION:  Similar hypoechoic nodules are seen at the site of symptoms as guided by the patient.  They likely reflect small lymph nodes.  Continued clinical correlation recommended.       US Head/Neck -  4/01/2025  Impression   CONCLUSION:    1. Compared to the exam of 1/17/2024, there is increase in the size of the abnormal lymph node anterior to the right ear presently measures 1.5 x 1.3 cm, previously 1.2 x 0.8 cm.  The differential would include reactive lymphadenopathy, primary versus  secondary neoplasm.  If further imaging is required CT of the neck with contrast would be recommended.  Alternatively tissue sampling could be performed.  2. There is a normal appearing 0.5 x 0.4 cm lymph node in the left submandibular triangle.  This is smaller, previously measured 0.9 x 0.6 cm.  3. There is a stable abnormal appearing complex lymph node/mass within the left submandibular region measuring 1.1 x 0.7 cm.  This previously measured 1.2 x 0.7 cm and is unchanged.       ASSESSMENT/PLAN:  Jonathan Barba is a 49 year old female with     ICD-10-CM   1. Pleomorphic adenoma of parotid gland  D11.0   2. Parotid mass  K11.8        Assessment & Plan  Infected pleomorphic adenoma of parotid gland  Infection due to salivary duct blockage by adenoma causing tenderness, firmness, and pain. Infection resolution required before surgical excision to prevent complications.  - Prescribed Augmentin 875 mg/125 mg orally twice daily for one week.  - Advised taking yogurt or probiotic with  Augmentin to prevent gastrointestinal upset.  - Schedule follow-up in one week to assess antibiotic response and determine if surgery needs rescheduling.  - Consult with Dr. Daugherty regarding surgery timing and potential rescheduling.  - Ensure medical clearance is completed before surgery.  Situation reviewed with the patient in detail.    Monico Leary MD  Otology/Otolaryngology  [facility]   [address] [facility] [address] [place] [id number]  Phone [phone]  Fax [phone]         [1]   Past Medical History:   Abnormal uterine bleeding    Amenorrhea    Back pain    Comes and goes    Dysmenorrhea    Endometriosis    Esophageal reflux    On and off    Fibroids    Food intolerance    Mild Lactose intolerance    Gastric Erosion W/O hem    Gastritis    Gastroesophageal reflux disease    Heartburn    High cholesterol    Hyperlipidemia    Menses painful    Comes and goes    Myoma    uterine    Night sweats    Maybe due to menopause    Right upper quadrant pain    Scoliosis    Wears glasses    For computer use or near reading only    Weight gain   [2]   Past Surgical History:  Procedure Laterality Date    Colonoscopy      Upper gi endoscopy,exam     [3]   Current Outpatient Medications on File Prior to Visit   Medication Sig Dispense Refill    fenofibrate 145 MG Oral Tab TAKE 1 TABLET BY MOUTH EVERY DAY (Patient taking differently: Take 1 tablet (145 mg total) by mouth at bedtime.) 90 tablet 1    omeprazole 20 MG Oral Capsule Delayed Release TAKE 1 CAPSULE DAILY 30 MINUTES BEFORE BREAKFAST 30 capsule 0    Calcium Carbonate-Vitamin D (CALCIUM + D OR) Take by mouth in the morning.       No current facility-administered medications on file prior to visit.   [4]   Allergies  Allergen Reactions    Oysters RASH   [5]   Family History  Problem Relation Age of Onset    Heart Disorder Mother     Diabetes Father             No Known Problems Sister     No Known Problems Brother     Other  (pneumonia) Brother     Breast Cancer Maternal Grandmother 80

## 2025-08-06 ENCOUNTER — HOSPITAL ENCOUNTER (OUTPATIENT)
Facility: HOSPITAL | Age: 50
Setting detail: HOSPITAL OUTPATIENT SURGERY
Discharge: HOME | End: 2025-08-06
Attending: OTOLARYNGOLOGY | Admitting: OTOLARYNGOLOGY

## 2025-08-06 ENCOUNTER — ANESTHESIA (OUTPATIENT)
Dept: SURGERY | Facility: HOSPITAL | Age: 50
End: 2025-08-06

## 2025-08-06 ENCOUNTER — ANESTHESIA EVENT (OUTPATIENT)
Dept: SURGERY | Facility: HOSPITAL | Age: 50
End: 2025-08-06

## 2025-08-06 VITALS
RESPIRATION RATE: 21 BRPM | BODY MASS INDEX: 31.06 KG/M2 | DIASTOLIC BLOOD PRESSURE: 76 MMHG | SYSTOLIC BLOOD PRESSURE: 126 MMHG | OXYGEN SATURATION: 93 % | WEIGHT: 158.19 LBS | TEMPERATURE: 97 F | HEART RATE: 91 BPM | HEIGHT: 60 IN

## 2025-08-06 DIAGNOSIS — D11.0 PLEOMORPHIC ADENOMA OF PAROTID GLAND: ICD-10-CM

## 2025-08-06 PROBLEM — K11.8 MASS OF RIGHT PAROTID GLAND: Status: ACTIVE | Noted: 2025-08-06

## 2025-08-06 LAB — B-HCG UR QL: NEGATIVE

## 2025-08-06 PROCEDURE — 42415 EXCISE PAROTID GLAND/LESION: CPT | Performed by: OTOLARYNGOLOGY

## 2025-08-06 RX ORDER — ONDANSETRON 2 MG/ML
4 INJECTION INTRAMUSCULAR; INTRAVENOUS EVERY 6 HOURS PRN
Status: DISCONTINUED | OUTPATIENT
Start: 2025-08-06 | End: 2025-08-06

## 2025-08-06 RX ORDER — NALOXONE HYDROCHLORIDE 0.4 MG/ML
80 INJECTION, SOLUTION INTRAMUSCULAR; INTRAVENOUS; SUBCUTANEOUS AS NEEDED
Status: DISCONTINUED | OUTPATIENT
Start: 2025-08-06 | End: 2025-08-06

## 2025-08-06 RX ORDER — FAMOTIDINE 20 MG/1
20 TABLET, FILM COATED ORAL ONCE
Status: COMPLETED | OUTPATIENT
Start: 2025-08-06 | End: 2025-08-06

## 2025-08-06 RX ORDER — LIDOCAINE HYDROCHLORIDE 10 MG/ML
INJECTION, SOLUTION EPIDURAL; INFILTRATION; INTRACAUDAL; PERINEURAL AS NEEDED
Status: DISCONTINUED | OUTPATIENT
Start: 2025-08-06 | End: 2025-08-06 | Stop reason: SURG

## 2025-08-06 RX ORDER — HYDROMORPHONE HYDROCHLORIDE 1 MG/ML
0.2 INJECTION, SOLUTION INTRAMUSCULAR; INTRAVENOUS; SUBCUTANEOUS EVERY 5 MIN PRN
Status: DISCONTINUED | OUTPATIENT
Start: 2025-08-06 | End: 2025-08-06

## 2025-08-06 RX ORDER — SODIUM CHLORIDE, SODIUM LACTATE, POTASSIUM CHLORIDE, CALCIUM CHLORIDE 600; 310; 30; 20 MG/100ML; MG/100ML; MG/100ML; MG/100ML
INJECTION, SOLUTION INTRAVENOUS CONTINUOUS
Status: DISCONTINUED | OUTPATIENT
Start: 2025-08-06 | End: 2025-08-06

## 2025-08-06 RX ORDER — HYDROMORPHONE HYDROCHLORIDE 1 MG/ML
0.4 INJECTION, SOLUTION INTRAMUSCULAR; INTRAVENOUS; SUBCUTANEOUS EVERY 5 MIN PRN
Status: DISCONTINUED | OUTPATIENT
Start: 2025-08-06 | End: 2025-08-06

## 2025-08-06 RX ORDER — HYDROCODONE BITARTRATE AND ACETAMINOPHEN 5; 325 MG/1; MG/1
1 TABLET ORAL EVERY 6 HOURS PRN
Qty: 12 TABLET | Refills: 0 | Status: SHIPPED | OUTPATIENT
Start: 2025-08-06

## 2025-08-06 RX ORDER — LIDOCAINE HYDROCHLORIDE AND EPINEPHRINE 10; 10 MG/ML; UG/ML
INJECTION, SOLUTION INFILTRATION; PERINEURAL AS NEEDED
Status: DISCONTINUED | OUTPATIENT
Start: 2025-08-06 | End: 2025-08-06 | Stop reason: HOSPADM

## 2025-08-06 RX ORDER — PHENYLEPHRINE HCL 10 MG/ML
VIAL (ML) INJECTION AS NEEDED
Status: DISCONTINUED | OUTPATIENT
Start: 2025-08-06 | End: 2025-08-06 | Stop reason: SURG

## 2025-08-06 RX ORDER — METOCLOPRAMIDE HYDROCHLORIDE 5 MG/ML
10 INJECTION INTRAMUSCULAR; INTRAVENOUS ONCE
Status: COMPLETED | OUTPATIENT
Start: 2025-08-06 | End: 2025-08-06

## 2025-08-06 RX ORDER — HYDROCODONE BITARTRATE AND ACETAMINOPHEN 5; 325 MG/1; MG/1
1 TABLET ORAL ONCE
Refills: 0 | Status: COMPLETED | OUTPATIENT
Start: 2025-08-06 | End: 2025-08-06

## 2025-08-06 RX ORDER — MIDAZOLAM HYDROCHLORIDE 1 MG/ML
INJECTION INTRAMUSCULAR; INTRAVENOUS AS NEEDED
Status: DISCONTINUED | OUTPATIENT
Start: 2025-08-06 | End: 2025-08-06 | Stop reason: SURG

## 2025-08-06 RX ORDER — ONDANSETRON 2 MG/ML
INJECTION INTRAMUSCULAR; INTRAVENOUS AS NEEDED
Status: DISCONTINUED | OUTPATIENT
Start: 2025-08-06 | End: 2025-08-06 | Stop reason: SURG

## 2025-08-06 RX ORDER — DEXAMETHASONE SODIUM PHOSPHATE 4 MG/ML
VIAL (ML) INJECTION AS NEEDED
Status: DISCONTINUED | OUTPATIENT
Start: 2025-08-06 | End: 2025-08-06 | Stop reason: SURG

## 2025-08-06 RX ORDER — MORPHINE SULFATE 4 MG/ML
2 INJECTION, SOLUTION INTRAMUSCULAR; INTRAVENOUS EVERY 10 MIN PRN
Status: DISCONTINUED | OUTPATIENT
Start: 2025-08-06 | End: 2025-08-06

## 2025-08-06 RX ORDER — GLYCOPYRROLATE 0.2 MG/ML
INJECTION, SOLUTION INTRAMUSCULAR; INTRAVENOUS AS NEEDED
Status: DISCONTINUED | OUTPATIENT
Start: 2025-08-06 | End: 2025-08-06 | Stop reason: SURG

## 2025-08-06 RX ORDER — ACETAMINOPHEN 500 MG
1000 TABLET ORAL ONCE
Status: COMPLETED | OUTPATIENT
Start: 2025-08-06 | End: 2025-08-06

## 2025-08-06 RX ORDER — CEFADROXIL 500 MG/1
500 CAPSULE ORAL 2 TIMES DAILY
Qty: 10 CAPSULE | Refills: 0 | Status: SHIPPED | OUTPATIENT
Start: 2025-08-06 | End: 2025-08-11

## 2025-08-06 RX ORDER — HYDROMORPHONE HYDROCHLORIDE 1 MG/ML
0.6 INJECTION, SOLUTION INTRAMUSCULAR; INTRAVENOUS; SUBCUTANEOUS EVERY 5 MIN PRN
Status: DISCONTINUED | OUTPATIENT
Start: 2025-08-06 | End: 2025-08-06

## 2025-08-06 RX ORDER — MORPHINE SULFATE 10 MG/ML
6 INJECTION, SOLUTION INTRAMUSCULAR; INTRAVENOUS EVERY 10 MIN PRN
Status: DISCONTINUED | OUTPATIENT
Start: 2025-08-06 | End: 2025-08-06

## 2025-08-06 RX ORDER — PROCHLORPERAZINE EDISYLATE 5 MG/ML
5 INJECTION INTRAMUSCULAR; INTRAVENOUS EVERY 8 HOURS PRN
Status: DISCONTINUED | OUTPATIENT
Start: 2025-08-06 | End: 2025-08-06

## 2025-08-06 RX ORDER — MORPHINE SULFATE 4 MG/ML
4 INJECTION, SOLUTION INTRAMUSCULAR; INTRAVENOUS EVERY 10 MIN PRN
Status: DISCONTINUED | OUTPATIENT
Start: 2025-08-06 | End: 2025-08-06

## 2025-08-06 RX ORDER — FAMOTIDINE 10 MG/ML
20 INJECTION, SOLUTION INTRAVENOUS ONCE
Status: COMPLETED | OUTPATIENT
Start: 2025-08-06 | End: 2025-08-06

## 2025-08-06 RX ORDER — METOCLOPRAMIDE 10 MG/1
10 TABLET ORAL ONCE
Status: COMPLETED | OUTPATIENT
Start: 2025-08-06 | End: 2025-08-06

## 2025-08-06 RX ADMIN — MIDAZOLAM HYDROCHLORIDE 2 MG: 1 INJECTION INTRAMUSCULAR; INTRAVENOUS at 11:08:00

## 2025-08-06 RX ADMIN — DEXAMETHASONE SODIUM PHOSPHATE 8 MG: 4 MG/ML VIAL (ML) INJECTION at 11:14:00

## 2025-08-06 RX ADMIN — ONDANSETRON 4 MG: 2 INJECTION INTRAMUSCULAR; INTRAVENOUS at 11:14:00

## 2025-08-06 RX ADMIN — PHENYLEPHRINE HCL 100 MCG: 10 MG/ML VIAL (ML) INJECTION at 11:35:00

## 2025-08-06 RX ADMIN — GLYCOPYRROLATE 0.2 MG: 0.2 INJECTION, SOLUTION INTRAMUSCULAR; INTRAVENOUS at 11:14:00

## 2025-08-06 RX ADMIN — LIDOCAINE HYDROCHLORIDE 50 MG: 10 INJECTION, SOLUTION EPIDURAL; INFILTRATION; INTRACAUDAL; PERINEURAL at 11:12:00

## 2025-08-06 RX ADMIN — PHENYLEPHRINE HCL 100 MCG: 10 MG/ML VIAL (ML) INJECTION at 11:44:00

## 2025-08-07 ENCOUNTER — TELEPHONE (OUTPATIENT)
Dept: OTOLARYNGOLOGY | Facility: CLINIC | Age: 50
End: 2025-08-07

## 2025-08-08 ENCOUNTER — TELEPHONE (OUTPATIENT)
Dept: OTOLARYNGOLOGY | Facility: CLINIC | Age: 50
End: 2025-08-08

## 2025-08-08 RX ORDER — DOCUSATE SODIUM 100 MG/1
100 CAPSULE, LIQUID FILLED ORAL 2 TIMES DAILY PRN
Qty: 30 CAPSULE | Refills: 0 | Status: SHIPPED | OUTPATIENT
Start: 2025-08-08

## 2025-08-11 ENCOUNTER — NURSE ONLY (OUTPATIENT)
Dept: OTOLARYNGOLOGY | Facility: CLINIC | Age: 50
End: 2025-08-11

## 2025-08-11 VITALS — DIASTOLIC BLOOD PRESSURE: 82 MMHG | HEART RATE: 82 BPM | SYSTOLIC BLOOD PRESSURE: 133 MMHG | TEMPERATURE: 98 F

## 2025-08-11 DIAGNOSIS — Z48.03 CHANGE OR REMOVAL OF DRAINS: Primary | ICD-10-CM

## 2025-08-12 ENCOUNTER — PATIENT MESSAGE (OUTPATIENT)
Facility: LOCATION | Age: 50
End: 2025-08-12

## 2025-08-13 ENCOUNTER — OFFICE VISIT (OUTPATIENT)
Dept: OTOLARYNGOLOGY | Facility: CLINIC | Age: 50
End: 2025-08-13

## 2025-08-13 DIAGNOSIS — D11.0 PLEOMORPHIC ADENOMA OF PAROTID GLAND: Primary | ICD-10-CM

## 2025-08-13 PROCEDURE — 99024 POSTOP FOLLOW-UP VISIT: CPT | Performed by: OTOLARYNGOLOGY

## (undated) DIAGNOSIS — E78.5 HYPERLIPIDEMIA, UNSPECIFIED HYPERLIPIDEMIA TYPE: ICD-10-CM

## (undated) DEVICE — Device

## (undated) DEVICE — SOLUTION IRRIG 1000ML 0.9% NACL USP BTL

## (undated) DEVICE — APPLICATOR PREP 26ML CHG 2% ISO ALC 70%

## (undated) DEVICE — MONITORING NEUROPHYSIOLOGICAL

## (undated) DEVICE — SUT ETHLN 4-0 18IN PS-2 NABSRB BLK 19MM 3/8 C

## (undated) DEVICE — GLOVE SUR 6.5 SENSICARE PI PIP GRN PWD F

## (undated) DEVICE — SUT ETHLN 5-0 18IN P-3 NABSRB BLK 13MM 3/8 CI

## (undated) DEVICE — SUT ETHLN 5-0 18IN FS-2 NABSRB BLK 19MM 3/8 C

## (undated) DEVICE — DRAIN SURG W7MMXL20CM SIL HUBLESS FLAT FLL

## (undated) DEVICE — EVACUATOR SUR 100CC SIL BLB WND

## (undated) DEVICE — GLOVE SUR 6 SENSICARE PI PIP CRM PWD F

## (undated) DEVICE — SUT PERMA- 2-0 18IN NABSRB BLK TIE SILK

## (undated) DEVICE — SUT COAT VCRL 3-0 18IN SH ABSRB UD CR 26MM

## (undated) DEVICE — PACK CUSTTOM NECK ACCESSORY

## (undated) DEVICE — SUT VCRL 4-0 18IN ABSRB UD L13MM P-3 3/8

## (undated) DEVICE — SUT PERMA- 2-0 30IN SH NABSRB BLK L26MM 1/

## (undated) DEVICE — SUT PERMA- 2-0 18IN FS NABSRB BLK 26MM 3/8

## (undated) NOTE — LETTER
02/12/18        [address] [address]      Dear Alek Bose,    [address] [facility] records indicate that you have outstanding lab work and or testing that was ordered for you and has not yet been completed:          C

## (undated) NOTE — MR AVS SNAPSHOT
[address] [address] [phone]               Thank you for choosing us for your health care visit with Arias Durant MD.  We are glad to serve you and happy to provide you with These medications were sent to [id number] - Cherabynn Chantal, [address] [phone], [address] [place] [address], [address] [place] [address]     Phone:  [phone]    - amoxicillin 875 MG Tabs            MyChart     Visit MyChart  You ca

## (undated) NOTE — LETTER
06/26/17        [address]  [address] [facility] [address]    11/16/1975     Dear Jackelyn Michaels,    [address] [facility] records indicate that you have outstanding lab work and or testing that was ordered for you and has not yet been complete

## (undated) NOTE — MR AVS SNAPSHOT
[address] [address] [phone]               Thank you for choosing us for your health care visit with Ramone Fonseca MD.  We are glad to serve you and happy to provide you with These medications were sent to [facility] - Rashaun Alexander, [address] [phone], [address] [place] [address], [place]lyssa Cardoso [address]     Phone:  [phone]    - Fenofibrate 47 MG Tabs            MyChart     Visit MyChart  You can

## (undated) NOTE — LETTER
Requirements for Pre-Operative Clearance Requests  **All Fields Must Be Completed**    2025    Dear Surgeon,    We have been notified that your patient Jonathan Barba  1975, is scheduled for surgery and that you would like us to clear him/her for this procedure.  In order to comply with governmental coding requirements and in order to bill for our services, the following is required for us to be able to see this patient for pre-operative clearance.     Pre-op appointment is scheduled on 25 with Dr Larsen     Comorbid diagnosis for which clearance is requested:          Surgical Diagnosis:            Procedure:           Surgeon:            Date of surgery:          Length of Surgery:      __________________________________________  Type of anesthesia:     __________________________________________  Location of surgery:           Phone:          Fax:         Completed pre-operative clearance should be faxed to:    Attention:          Phone:         Fax:         Check One:    ¨ Pre-op testing ordered by surgeon  ¨ Pre-op testing to be ordered by pre-admission testing  ¨ No pre-op testing needed    When this form is complete, please fax back to [phone]